# Patient Record
Sex: FEMALE | Race: ASIAN | Employment: FULL TIME | URBAN - METROPOLITAN AREA
[De-identification: names, ages, dates, MRNs, and addresses within clinical notes are randomized per-mention and may not be internally consistent; named-entity substitution may affect disease eponyms.]

---

## 2017-02-10 ENCOUNTER — OFFICE VISIT (OUTPATIENT)
Dept: INTERNAL MEDICINE CLINIC | Age: 47
End: 2017-02-10

## 2017-02-10 VITALS
SYSTOLIC BLOOD PRESSURE: 112 MMHG | HEART RATE: 79 BPM | WEIGHT: 161 LBS | HEIGHT: 60 IN | RESPIRATION RATE: 16 BRPM | DIASTOLIC BLOOD PRESSURE: 80 MMHG | BODY MASS INDEX: 31.61 KG/M2 | OXYGEN SATURATION: 97 % | TEMPERATURE: 98.2 F

## 2017-02-10 DIAGNOSIS — S16.1XXA NECK STRAIN, INITIAL ENCOUNTER: Primary | ICD-10-CM

## 2017-02-10 RX ORDER — CYCLOBENZAPRINE HCL 5 MG
5 TABLET ORAL
Qty: 30 TAB | Refills: 0 | Status: SHIPPED | OUTPATIENT
Start: 2017-02-10 | End: 2018-08-28 | Stop reason: SDUPTHER

## 2017-02-10 NOTE — PATIENT INSTRUCTIONS
Neck Spasm: Exercises  Your Care Instructions  Here are some examples of typical rehabilitation exercises for your condition. Start each exercise slowly. Ease off the exercise if you start to have pain. Your doctor or physical therapist will tell you when you can start these exercises and which ones will work best for you. How to do the exercises  Levator scapula stretch    1. Sit in a firm chair, or stand up straight. 2. Gently tilt your head toward your left shoulder. 3. Turn your head to look down into your armpit, bending your head slightly forward. Let the weight of your head stretch your neck muscles. 4. Hold for 15 to 30 seconds. 5. Return to your starting position. 6. Follow the same instructions above, but tilt your head toward your right shoulder. 7. Repeat 2 to 4 times toward each shoulder. Upper trapezius stretch    1. Sit in a firm chair, or stand up straight. 2. This stretch works best if you keep your shoulder down as you lean away from it. To help you remember to do this, start by relaxing your shoulders and lightly holding on to your thighs or your chair. 3. Tilt your head toward your shoulder and hold for 15 to 30 seconds. Let the weight of your head stretch your muscles. 4. If you would like a little added stretch, place your arm behind your back. Use the arm opposite of the direction you are tilting your head. For example, if you are tilting your head to the left, place your right arm behind your back. 5. Repeat 2 to 4 times toward each shoulder. Neck rotation    1. Sit in a firm chair, or stand up straight. 2. Keeping your chin level, turn your head to the right, and hold for 15 to 30 seconds. 3. Turn your head to the left, and hold for 15 to 30 seconds. 4. Repeat 2 to 4 times to each side. Chin tuck    1. Lie on the floor with a rolled-up towel under your neck. Your head should be touching the floor. 2. Slowly bring your chin toward the front of your neck.   3. Hold for a count of 6, and then relax for up to 10 seconds. 4. Repeat 8 to 12 times. Forward neck flexion    1. Sit in a firm chair, or stand up straight. 2. Bend your head forward. 3. Hold for 15 to 30 seconds, then return to your starting position. 4. Repeat 2 to 4 times. Follow-up care is a key part of your treatment and safety. Be sure to make and go to all appointments, and call your doctor if you are having problems. It's also a good idea to know your test results and keep a list of the medicines you take. Where can you learn more? Go to http://fredi-luis.info/. Enter P962 in the search box to learn more about \"Neck Spasm: Exercises. \"  Current as of: May 23, 2016  Content Version: 11.1  © 9455-8335 [employer]. Care instructions adapted under license by [employer] (which disclaims liability or warranty for this information). If you have questions about a medical condition or this instruction, always ask your healthcare professional. [place] [address] any warranty or liability for your use of this information. Neck: Exercises  Your Care Instructions  Here are some examples of typical rehabilitation exercises for your condition. Start each exercise slowly. Ease off the exercise if you start to have pain. Your doctor or physical therapist will tell you when you can start these exercises and which ones will work best for you. How to do the exercises  Note: Stretching should make you feel a gentle stretch, but no pain. Stop any strengthening exercise that makes pain worse. Neck stretch    8. This stretch works best if you keep your shoulder down as you lean away from it. To help you remember to do this, start by relaxing your shoulders and lightly holding on to your thighs or your chair. 9. Tilt your head toward your shoulder and hold for 15 to 30 seconds. Let the weight of your head stretch your muscles.   10. If you would like a little added stretch, use your hand to gently and steadily pull your head toward your shoulder. For example, keeping your right shoulder down, lean your head to the left. 11. Repeat 2 to 4 times toward each shoulder. Diagonal neck stretch    6. Turn your head slightly toward the direction you will be stretching, and tilt your head diagonally toward your chest and hold for 15 to 30 seconds. 7. If you would like a little added stretch, use your hand to gently and steadily pull your head forward on the diagonal.  8. Repeat 2 to 4 times toward each side. Dorsal glide stretch    5. Sit or stand tall and look straight ahead. 6. Slowly tuck your chin as you glide your head backward over your body  7. Hold for a count of 6, and then relax for up to 10 seconds. 8. Repeat 8 to 12 times. Note: The dorsal glide stretches the back of the neck. If you feel pain, do not glide so far back. Some people find this exercise easier to do while lying on their backs with an ice pack on the neck. Chest and shoulder stretch    5. Sit or stand tall and glide your head backward as in the dorsal glide stretch. 6. Raise both arms so that your hands are next to your ears. 7. Take a deep breath, and as you breathe out, lower your elbows down and behind your back. You will feel your shoulder blades slide down and together, and at the same time you will feel a stretch across your chest and the front of your shoulders. 8. Hold for about 6 seconds, and then relax for up to 10 seconds. 9. Repeat 8 to 12 times. Strengthening: Hands on head    5. Move your head backward, forward, and side to side against gentle pressure from your hands, holding each position for about 6 seconds. 6. Repeat 8 to 12 times. Follow-up care is a key part of your treatment and safety. Be sure to make and go to all appointments, and call your doctor if you are having problems. It's also a good idea to know your test results and keep a list of the medicines you take.   Where can you learn more? Go to http://fredi-luis.info/. Enter P975 in the search box to learn more about \"Neck: Exercises. \"  Current as of: May 23, 2016  Content Version: 11.1  © 0873-5579 [employer]. Care instructions adapted under license by [employer] (which disclaims liability or warranty for this information). If you have questions about a medical condition or this instruction, always ask your healthcare professional. [address] any warranty or liability for your use of this information.

## 2017-02-10 NOTE — PROGRESS NOTES
HPI:  Magdy Rueda is a 41y.o. year old female who returns to clinic today for an acute visit:   4 days ago onset of bilateral neck pain right more than left side and started after slept on sofa because her dog had surgery. She denies any injury, no radiation or numbness/tingling or weakness. Current Outpatient Prescriptions   Medication Sig Dispense Refill    glucose blood VI test strips (ONETOUCH ULTRA TEST) strip Check BS BID as directed. 100 Strip 1    coenzyme q10-vitamin e (COQ10 ) 100-100 mg-unit cap Take  by mouth.  multivitamin (ONE A DAY) tablet Take 1 Tab by mouth daily.  B.infantis-B.ani-B.long-B.bifi (PROBIOTIC 4X) 10-15 mg TbEC Take  by mouth.  Lancets misc Check BS as directed 100 Each 1    naproxen sodium (ALEVE) 220 mg cap Take 1 capsule by mouth as needed.  Cetirizine (ZYRTEC) 10 mg cap Take 1 capsule by mouth as needed. No Known Allergies  Social History   Substance Use Topics    Smoking status: Never Smoker    Smokeless tobacco: Never Used    Alcohol use No         Review of Systems   Constitutional: Negative for chills and fever. HENT: Negative for congestion and sore throat. Respiratory: Negative for shortness of breath. Cardiovascular: Negative for chest pain. Neurological: Negative for sensory change and focal weakness. Physical Exam   Constitutional: She appears well-developed. No distress. /80 (BP 1 Location: Right arm, BP Patient Position: Sitting)  Pulse 79  Temp 98.2 °F (36.8 °C)  Resp 16  Ht 5' (1.524 m)  Wt 161 lb (73 kg)  LMP 02/03/2017  SpO2 97%  BMI 31.44 kg/m2   Cardiovascular: Intact distal pulses. Abdominal: Soft. Bowel sounds are normal. There is no tenderness. Musculoskeletal: She exhibits no edema. Cervical back: She exhibits tenderness (paracervical musculature) and spasm. She exhibits normal range of motion and no bony tenderness.         Thoracic back: Normal.        Lumbar back: Normal.   Neurological: nonfocal   Vitals reviewed. Assessment & Plan:    ICD-10-CM ICD-9-CM    1. Neck strain, initial encounter S16. 1XXA 847.0    given exercises, ice, flexeril prn, advil prn. Orders Placed This Encounter    cyclobenzaprine (FLEXERIL) 5 mg tablet      Follow-up Disposition:  Return if symptoms worsen or fail to improve. Advised her to call back or return to office if symptoms worsen/change/persist.  Discussed expected course/resolution/complications of diagnosis in detail with patient. Medication risks/benefits/costs/interactions/alternatives discussed with patient. She was given an after visit summary which includes diagnoses, current medications, & vitals. She expressed understanding with the diagnosis and plan.

## 2017-02-10 NOTE — MR AVS SNAPSHOT
Visit Information Date & Time Provider Department Dept. Phone Encounter #  
 2/10/2017  3:30 PM Etienne Smith, [address] [facility] Internal Medicine [phone] [id number] Follow-up Instructions Return if symptoms worsen or fail to improve. Upcoming Health Maintenance Date Due  
 FOOT EXAM Q1 1/20/1982 MICROALBUMIN Q1 1/20/1982 Pneumococcal 19-64 Medium Risk (1 of 1 - PPSV23) 1/20/1991 HEMOGLOBIN A1C Q6M 6/3/2016 INFLUENZA AGE 9 TO ADULT 8/1/2016 LIPID PANEL Q1 12/4/2016 PAP AKA CERVICAL CYTOLOGY 6/1/2017 EYE EXAM RETINAL OR DILATED Q1 5/13/2017 DTaP/Tdap/Td series (2 - Td) 5/4/2022 Allergies as of 2/10/2017  Review Complete On: 2/10/2017 By: Etienne Smith MD  
 No Known Allergies Current Immunizations  Reviewed on 5/13/2016 Name Date Hepatitis B Vaccine 1/1/2001 Influenza Vaccine 10/9/2015, 10/28/2014, 10/15/2013 MMR Vaccine 1/1/2000 PPD 1/1/2000 TD Vaccine 1/1/2000 TDAP Vaccine 5/4/2012 Not reviewed this visit You Were Diagnosed With   
  
 Codes Comments Neck strain, initial encounter    -  Primary ICD-10-CM: S16. Carlos Orantes ICD-9-CM: 086. 0 Vitals BP Pulse Temp Resp Height(growth percentile) Weight(growth percentile) 112/80 (BP 1 Location: Right arm, BP Patient Position: Sitting) 79 98.2 °F (36.8 °C) 16 5' (1.524 m) 161 lb (73 kg) LMP SpO2 BMI OB Status Smoking Status 02/03/2017 97% 31.44 kg/m2 Having regular periods Never Smoker BMI and BSA Data Body Mass Index Body Surface Area  
 31.44 kg/m 2 1.76 m 2 Preferred Pharmacy Pharmacy Name Phone [id number], [address] [place] [address] [phone] Your Updated Medication List  
  
   
This list is accurate as of: 2/10/17  4:21 PM.  Always use your most recent med list.  
  
  
  
  
 ALEVE 220 mg Cap Generic drug:  naproxen sodium Take 1 capsule by mouth as needed. COQ10  100-100 mg-unit Cap Generic drug:  coenzyme q10-vitamin e Take  by mouth. cyclobenzaprine 5 mg tablet Commonly known as:  FLEXERIL Take 1 Tab by mouth three (3) times daily as needed for Muscle Spasm(s). glucose blood VI test strips strip Commonly known as:  ONETOUCH ULTRA TEST Check BS BID as directed. Lancets Misc Check BS as directed  
  
 multivitamin tablet Commonly known as:  ONE A DAY Take 1 Tab by mouth daily. PROBIOTIC 4X 10-15 mg Tbec Generic drug:  B.infantis-B.ani-B.long-B.bifi Take  by mouth. ZyrTEC 10 mg Cap Generic drug:  Cetirizine Take 1 capsule by mouth as needed. Prescriptions Sent to Pharmacy Refills  
 cyclobenzaprine (FLEXERIL) 5 mg tablet 0 Sig: Take 1 Tab by mouth three (3) times daily as needed for Muscle Spasm(s). Class: Normal  
 Pharmacy: [facility]/pharmacy #[id number] [place] [address] #: [phone] Route: Oral  
  
Follow-up Instructions Return if symptoms worsen or fail to improve. Patient Instructions Neck Spasm: Exercises Your Care Instructions Here are some examples of typical rehabilitation exercises for your condition. Start each exercise slowly. Ease off the exercise if you start to have pain. Your doctor or physical therapist will tell you when you can start these exercises and which ones will work best for you. How to do the exercises Levator scapula stretch 1. Sit in a firm chair, or stand up straight. 2. Gently tilt your head toward your left shoulder. 3. Turn your head to look down into your armpit, bending your head slightly forward. Let the weight of your head stretch your neck muscles. 4. Hold for 15 to 30 seconds. 5. Return to your starting position. 6. Follow the same instructions above, but tilt your head toward your right shoulder. 7. Repeat 2 to 4 times toward each shoulder. Upper trapezius stretch 1. Sit in a firm chair, or stand up straight. 2. This stretch works best if you keep your shoulder down as you lean away from it. To help you remember to do this, start by relaxing your shoulders and lightly holding on to your thighs or your chair. 3. Tilt your head toward your shoulder and hold for 15 to 30 seconds. Let the weight of your head stretch your muscles. 4. If you would like a little added stretch, place your arm behind your back. Use the arm opposite of the direction you are tilting your head. For example, if you are tilting your head to the left, place your right arm behind your back. 5. Repeat 2 to 4 times toward each shoulder. Neck rotation 1. Sit in a firm chair, or stand up straight. 2. Keeping your chin level, turn your head to the right, and hold for 15 to 30 seconds. 3. Turn your head to the left, and hold for 15 to 30 seconds. 4. Repeat 2 to 4 times to each side. Chin tuck 1. Lie on the floor with a rolled-up towel under your neck. Your head should be touching the floor. 2. Slowly bring your chin toward the front of your neck. 3. Hold for a count of 6, and then relax for up to 10 seconds. 4. Repeat 8 to 12 times. Forward neck flexion 1. Sit in a firm chair, or stand up straight. 2. Bend your head forward. 3. Hold for 15 to 30 seconds, then return to your starting position. 4. Repeat 2 to 4 times. Follow-up care is a key part of your treatment and safety. Be sure to make and go to all appointments, and call your doctor if you are having problems. It's also a good idea to know your test results and keep a list of the medicines you take. Where can you learn more? Go to http://fredi-ulis.info/. Enter P962 in the search box to learn more about \"Neck Spasm: Exercises. \" Current as of: May 23, 2016 Content Version: 11.1 © 5399-9709 [employer].  Care instructions adapted under license by [address] (which disclaims liability or warranty for this information). If you have questions about a medical condition or this instruction, always ask your healthcare professional. [address] any warranty or liability for your use of this information. Neck: Exercises Your Care Instructions Here are some examples of typical rehabilitation exercises for your condition. Start each exercise slowly. Ease off the exercise if you start to have pain. Your doctor or physical therapist will tell you when you can start these exercises and which ones will work best for you. How to do the exercises Note: Stretching should make you feel a gentle stretch, but no pain. Stop any strengthening exercise that makes pain worse. Neck stretch 8. This stretch works best if you keep your shoulder down as you lean away from it. To help you remember to do this, start by relaxing your shoulders and lightly holding on to your thighs or your chair. 9. Tilt your head toward your shoulder and hold for 15 to 30 seconds. Let the weight of your head stretch your muscles. 10. If you would like a little added stretch, use your hand to gently and steadily pull your head toward your shoulder. For example, keeping your right shoulder down, lean your head to the left. 11. Repeat 2 to 4 times toward each shoulder. Diagonal neck stretch 6. Turn your head slightly toward the direction you will be stretching, and tilt your head diagonally toward your chest and hold for 15 to 30 seconds. 7. If you would like a little added stretch, use your hand to gently and steadily pull your head forward on the diagonal. 
8. Repeat 2 to 4 times toward each side. Dorsal glide stretch 5. Sit or stand tall and look straight ahead. 6. Slowly tuck your chin as you glide your head backward over your body 7. Hold for a count of 6, and then relax for up to 10 seconds. 8. Repeat 8 to 12 times. Note: The dorsal glide stretches the back of the neck. If you feel pain, do not glide so far back. Some people find this exercise easier to do while lying on their backs with an ice pack on the neck. Chest and shoulder stretch 5. Sit or stand tall and glide your head backward as in the dorsal glide stretch. 6. Raise both arms so that your hands are next to your ears. 7. Take a deep breath, and as you breathe out, lower your elbows down and behind your back. You will feel your shoulder blades slide down and together, and at the same time you will feel a stretch across your chest and the front of your shoulders. 8. Hold for about 6 seconds, and then relax for up to 10 seconds. 9. Repeat 8 to 12 times. Strengthening: Hands on head 5. Move your head backward, forward, and side to side against gentle pressure from your hands, holding each position for about 6 seconds. 6. Repeat 8 to 12 times. Follow-up care is a key part of your treatment and safety. Be sure to make and go to all appointments, and call your doctor if you are having problems. It's also a good idea to know your test results and keep a list of the medicines you take. Where can you learn more? Go to http://fredi-luis.info/. Enter P975 in the search box to learn more about \"Neck: Exercises. \" Current as of: May 23, 2016 Content Version: 11.1 © 7676-6881 [employer]. Care instructions adapted under license by [employer] (which disclaims liability or warranty for this information). If you have questions about a medical condition or this instruction, always ask your healthcare professional. [place] [address] any warranty or liability for your use of this information. Introducing [facility]! Elise Zapata introduces [employer] patient portal. Now you can access parts of your medical record, email your doctor's office, and request medication refills online. 1. In your internet browser, go to [email or web address] 2. Click on the First Time User? Click Here link in the Sign In box. You will see the New Member Sign Up page. 3. Enter your [employer] Access Code exactly as it appears below. You will not need to use this code after youve completed the sign-up process. If you do not sign up before the expiration date, you must request a new code. · [employer] Access Code: [email or web address] Expires: 5/11/2017  4:21 PM 
 
4. Enter the last four digits of your Social Security Number (xxxx) and Date of Birth (mm/dd/yyyy) as indicated and click Submit. You will be taken to the next sign-up page. 5. Create a [employer] ID. This will be your [employer] login ID and cannot be changed, so think of one that is secure and easy to remember. 6. Create a [employer] password. You can change your password at any time. 7. Enter your Password Reset Question and Answer. This can be used at a later time if you forget your password. 8. Enter your e-mail address. You will receive e-mail notification when new information is available in [address] 9. Click Sign Up. You can now view and download portions of your medical record. 10. Click the Download Summary menu link to download a portable copy of your medical information. If you have questions, please visit the Frequently Asked Questions section of the [employer] website. Remember, [employer] is NOT to be used for urgent needs. For medical emergencies, dial 911. Now available from your iPhone and Android! Please provide this summary of care documentation to your next provider. Your primary care clinician is listed as Kiara Pierre. If you have any questions after today's visit, please call [phone].

## 2017-07-06 ENCOUNTER — TELEPHONE (OUTPATIENT)
Dept: INTERNAL MEDICINE CLINIC | Age: 47
End: 2017-07-06

## 2017-07-06 NOTE — TELEPHONE ENCOUNTER
Pt called and a message was left requesting a call back for an overdue appt. Per Md pt is overdue diabetes labs. Verify if Dr Osuna is still pt's PCP.

## 2018-08-28 ENCOUNTER — OFFICE VISIT (OUTPATIENT)
Dept: INTERNAL MEDICINE CLINIC | Age: 48
End: 2018-08-28

## 2018-08-28 VITALS
SYSTOLIC BLOOD PRESSURE: 140 MMHG | OXYGEN SATURATION: 97 % | HEART RATE: 64 BPM | WEIGHT: 168.6 LBS | RESPIRATION RATE: 16 BRPM | DIASTOLIC BLOOD PRESSURE: 90 MMHG | BODY MASS INDEX: 33.1 KG/M2 | TEMPERATURE: 98.2 F | HEIGHT: 60 IN

## 2018-08-28 DIAGNOSIS — E78.5 HYPERLIPIDEMIA, UNSPECIFIED HYPERLIPIDEMIA TYPE: ICD-10-CM

## 2018-08-28 DIAGNOSIS — J30.1 SEASONAL ALLERGIC RHINITIS DUE TO POLLEN: ICD-10-CM

## 2018-08-28 DIAGNOSIS — E11.9 CONTROLLED TYPE 2 DIABETES MELLITUS WITHOUT COMPLICATION, WITHOUT LONG-TERM CURRENT USE OF INSULIN (HCC): ICD-10-CM

## 2018-08-28 DIAGNOSIS — M62.838 CERVICAL PARASPINAL MUSCLE SPASM: Primary | ICD-10-CM

## 2018-08-28 RX ORDER — LANCETS
EACH MISCELLANEOUS
Qty: 100 EACH | Refills: 1 | Status: CANCELLED | OUTPATIENT
Start: 2018-08-28

## 2018-08-28 RX ORDER — CYCLOBENZAPRINE HCL 5 MG
5 TABLET ORAL
Qty: 30 TAB | Refills: 0 | Status: SHIPPED | OUTPATIENT
Start: 2018-08-28 | End: 2018-12-28

## 2018-08-28 NOTE — PROGRESS NOTES
HISTORY OF PRESENT ILLNESS Monty Quinones is a 57 y.o. female. HPI Patient complains of right lateral neck and shoulder pain, up her lateral neck to her posterior ear x 3-4 days. She denies inner ear pain. She also denies injury, but carries her heavy laptop on right side at times. She denies numbness, weakness, tingling, or radiating pain. Tylenol hs and a contoured memory foam pillow seems to help. Patient also has a history type II diabetes. She has not had labs and has not been checking blood sugar for at least 2 years. She has gained about 7 lbs since she was last seen but has started using her treadmill or walks/run outside 2 2-1/2 miles most days. Past Medical History:  
Diagnosis Date  Diabetes mellitus GDM- diet controlled  Diabetes mellitus type 2, controlled ([facility] [address] 12/9/2015 Current Outpatient Prescriptions on File Prior to Visit Medication Sig Dispense Refill  coenzyme q10-vitamin e (COQ10 ) 100-100 mg-unit cap Take  by mouth.  multivitamin (ONE A DAY) tablet Take 1 Tab by mouth daily.  B.infantis-B.ani-B.long-B.bifi (PROBIOTIC 4X) 10-15 mg TbEC Take  by mouth.  naproxen sodium (ALEVE) 220 mg cap Take 1 capsule by mouth as needed.  Cetirizine (ZYRTEC) 10 mg cap Take 1 capsule by mouth as needed.  cyclobenzaprine (FLEXERIL) 5 mg tablet Take 1 Tab by mouth three (3) times daily as needed for Muscle Spasm(s). 30 Tab 0  
 glucose blood VI test strips (ONETOUCH ULTRA TEST) strip Check BS BID as directed. 100 Strip 1  
 Lancets misc Check BS as directed 100 Each 1 No current facility-administered medications on file prior to visit. ROS Per HPI Physical Exam 
Visit Vitals  /90 (BP 1 Location: Right arm, BP Patient Position: Sitting)  Pulse 64  Temp 98.2 °F (36.8 °C) (Oral)  Resp 16  
 Ht 4' 11.75\" (1.518 m)  Wt 168 lb 9.6 oz (76.5 kg)  LMP 08/13/2018 (Approximate)  SpO2 97%  BMI 33.2 kg/m2 Patient appears well Sinuses: nontender Ears: tympanic membrane's and canals normal.  No erythema, fluid, drainage No temporomandibular joint tenderness Neck: no cervical spine tenderness, full range of motion, tender with spasm right trapezius muscle and right para cervical muscles. Heart[de-identified] normal rate, regular rhythm, normal S1, S2, no murmurs, rubs, clicks or gallops. Chest: clear to auscultation, no wheezes, rales or rhonchi, Extremities: no edema ASSESSMENT and PLAN Cervical muscle spasms Continue Tylenol as directed Moist heat to affected area 2-3 times daily Flexeril 5 mg tid prn Exercises provided for cervical muscle spasm/strain Call or return to clinic  if these symptoms worsen or fail to improve as anticipated. Type II DM: Patient is overdue for labs, Lipids, Hemoglobin A1C, Micro albumin, urinalysis, CMP, CBC, TSH ordered. Patient is not fasting, but has difficulty getting to appointments.  
Advised to schedule a physical exam with Nirmala Martinez MD

## 2018-08-28 NOTE — PATIENT INSTRUCTIONS
Continue Tylenol 2 tabs every 8 hours as needed Flexeril 5 mg three times daily as needed Moist heat to affected area 2-3 times daily Neck Strain or Sprain: Rehab Exercises Your Care Instructions Here are some examples of typical rehabilitation exercises for your condition. Start each exercise slowly. Ease off the exercise if you start to have pain. Your doctor or physical therapist will tell you when you can start these exercises and which ones will work best for you. How to do the exercises Neck rotation 1. Sit in a firm chair, or stand up straight. 2. Keeping your chin level, turn your head to the right, and hold for 15 to 30 seconds. 3. Turn your head to the left and hold for 15 to 30 seconds. 4. Repeat 2 to 4 times to each side. Neck stretches 1. Look straight ahead, and tip your right ear to your right shoulder. Do not let your left shoulder rise up as you tip your head to the right. 2. Hold for 15 to 30 seconds. 3. Tilt your head to the left. Do not let your right shoulder rise up as you tip your head to the left. 4. Hold for 15 to 30 seconds. 5. Repeat 2 to 4 times to each side. Forward neck flexion 1. Sit in a firm chair, or stand up straight. 2. Bend your head forward. 3. Hold for 15 to 30 seconds. 4. Repeat 2 to 4 times. Lateral (side) bend strengthening 1. With your right hand, place your first two fingers on your right temple. 2. Start to bend your head to the side while using gentle pressure from your fingers to keep your head from bending. 3. Hold for about 6 seconds. 4. Repeat 8 to 12 times. 5. Switch hands and repeat the same exercise on your left side. Forward bend strengthening 1. Place your first two fingers of either hand on your forehead. 2. Start to bend your head forward while using gentle pressure from your fingers to keep your head from bending. 3. Hold for about 6 seconds. 4. Repeat 8 to 12 times. Neutral position strengthening 1. Using one hand, place your fingertips on the back of your head at the top of your neck. 2. Start to bend your head backward while using gentle pressure from your fingers to keep your head from bending. 3. Hold for about 6 seconds. 4. Repeat 8 to 12 times. Chin tuck 1. Lie on the floor with a rolled-up towel under your neck. Your head should be touching the floor. 2. Slowly bring your chin toward your chest. 
3. Hold for a count of 6, and then relax for up to 10 seconds. 4. Repeat 8 to 12 times. Follow-up care is a key part of your treatment and safety. Be sure to make and go to all appointments, and call your doctor if you are having problems. It's also a good idea to know your test results and keep a list of the medicines you take. Where can you learn more? Go to http://fredi-luis.info/. Enter M679 in the search box to learn more about \"Neck Strain or Sprain: Rehab Exercises. \" Current as of: November 29, 2017 Content Version: 11.7 © 2431-2748 [employer]. Care instructions adapted under license by [employer] (which disclaims liability or warranty for this information). If you have questions about a medical condition or this instruction, always ask your healthcare professional. [place] [address] any warranty or liability for your use of this information. Call or return to clinic  if these symptoms worsen or fail to improve as anticipated.   
 
Schedule a physical exam with Domenic Mcardle, MD

## 2018-08-28 NOTE — PROGRESS NOTES
Pt. Is here for right ear down right neck pain x3d. Pt. Has not checked her glucose in over 1 year. Pt. Will schedule a physical with pap with Dr Rosario Ramirez.

## 2018-08-28 NOTE — MR AVS SNAPSHOT
[address] [place] [address] 
[phone] Patient: Jimy Valverde MRN: [id number] RAL:[id number] Visit Information Date & Time Provider Department Dept. Phone Encounter #  
 8/28/2018 10:15 AM Mark Duron, [address] [facility] Internal Medicine [phone] [id number] Upcoming Health Maintenance Date Due  
 FOOT EXAM Q1 1/20/1982 MICROALBUMIN Q1 1/20/1982 Pneumococcal 19-64 Medium Risk (1 of 1 - PPSV23) 1/20/1991 HEMOGLOBIN A1C Q6M 6/3/2016 LIPID PANEL Q1 12/4/2016 EYE EXAM RETINAL OR DILATED Q1 5/13/2017 PAP AKA CERVICAL CYTOLOGY 6/1/2017 Influenza Age 5 to Adult 8/1/2018 DTaP/Tdap/Td series (2 - Td) 5/4/2022 Allergies as of 8/28/2018  Review Complete On: 8/28/2018 By: Yesica Whitten LPN No Known Allergies Current Immunizations  Reviewed on 5/13/2016 Name Date Hepatitis B Vaccine 1/1/2001 Influenza Vaccine 10/9/2015, 10/28/2014, 10/15/2013 MMR Vaccine 1/1/2000 PPD 1/1/2000 TD Vaccine 1/1/2000 TDAP Vaccine 5/4/2012 Not reviewed this visit You Were Diagnosed With   
  
 Codes Comments Controlled type 2 diabetes mellitus without complication, without long-term current use of insulin ([facility] [address]    -  Primary ICD-10-CM: E11.9 ICD-9-CM: 250.00 Seasonal allergic rhinitis due to pollen     ICD-10-CM: J30.1 ICD-9-CM: 477.0 Hyperlipidemia, unspecified hyperlipidemia type     ICD-10-CM: E78.5 ICD-9-CM: 272.4 Vitals BP Pulse Temp Resp Height(growth percentile) Weight(growth percentile) 140/90 (BP 1 Location: Right arm, BP Patient Position: Sitting) 64 98.2 °F (36.8 °C) (Oral) 16 4' 11.75\" (1.518 m) 168 lb 9.6 oz (76.5 kg) LMP SpO2 BMI OB Status Smoking Status 08/13/2018 (Approximate) 97% 33.2 kg/m2 Having regular periods Never Smoker BMI and BSA Data Body Mass Index Body Surface Area  
 33.2 kg/m 2 1.8 m 2 Preferred Pharmacy Pharmacy Name Phone [facility], [address]  [place] [phone] Your Updated Medication List  
  
   
This list is accurate as of 8/28/18 11:17 AM.  Always use your most recent med list.  
  
  
  
  
 ALEVE 220 mg Cap Generic drug:  naproxen sodium Take 1 capsule by mouth as needed. COQ10  100-100 mg-unit Cap Generic drug:  coenzyme q10-vitamin e Take  by mouth. cyclobenzaprine 5 mg tablet Commonly known as:  FLEXERIL Take 1 Tab by mouth three (3) times daily as needed for Muscle Spasm(s). glucose blood VI test strips strip Commonly known as:  ONETOUCH ULTRA TEST Check BS BID as directed. Lancets Misc Check BS as directed  
  
 multivitamin tablet Commonly known as:  ONE A DAY Take 1 Tab by mouth daily. PROBIOTIC 4X 10-15 mg Tbec Generic drug:  B.infantis-B.ani-B.long-B.bifi Take  by mouth. ZyrTEC 10 mg Cap Generic drug:  Cetirizine Take 1 capsule by mouth as needed. Prescriptions Sent to Pharmacy Refills  
 cyclobenzaprine (FLEXERIL) 5 mg tablet 0 Sig: Take 1 Tab by mouth three (3) times daily as needed for Muscle Spasm(s). Class: Normal  
 Pharmacy: [facility] [address] [place] [address], [address] AT [address] [place] [address] #: [phone] Route: Oral  
  
We Performed the Following CBC WITH AUTOMATED DIFF [91886 CPT(R)] HEMOGLOBIN A1C WITH EAG [80242 CPT(R)] LIPID PANEL [94522 CPT(R)] METABOLIC PANEL, COMPREHENSIVE [62388 CPT(R)] MICROALBUMIN, UR, RAND W/ MICROALB/CREAT RATIO [id number] CPT(R)] TSH 3RD GENERATION [59743 CPT(R)] UA/M W/RFLX CULTURE, ROUTINE [[id number] Custom] Patient Instructions Continue Tylenol 2 tabs every 8 hours as needed Flexeril 5 mg three times daily as needed Moist heat to affected area 2-3 times daily Neck Strain or Sprain: Rehab Exercises Your Care Instructions Here are some examples of typical rehabilitation exercises for your condition. Start each exercise slowly. Ease off the exercise if you start to have pain. Your doctor or physical therapist will tell you when you can start these exercises and which ones will work best for you. How to do the exercises Neck rotation 1. Sit in a firm chair, or stand up straight. 2. Keeping your chin level, turn your head to the right, and hold for 15 to 30 seconds. 3. Turn your head to the left and hold for 15 to 30 seconds. 4. Repeat 2 to 4 times to each side. Neck stretches 1. Look straight ahead, and tip your right ear to your right shoulder. Do not let your left shoulder rise up as you tip your head to the right. 2. Hold for 15 to 30 seconds. 3. Tilt your head to the left. Do not let your right shoulder rise up as you tip your head to the left. 4. Hold for 15 to 30 seconds. 5. Repeat 2 to 4 times to each side. Forward neck flexion 1. Sit in a firm chair, or stand up straight. 2. Bend your head forward. 3. Hold for 15 to 30 seconds. 4. Repeat 2 to 4 times. Lateral (side) bend strengthening 1. With your right hand, place your first two fingers on your right temple. 2. Start to bend your head to the side while using gentle pressure from your fingers to keep your head from bending. 3. Hold for about 6 seconds. 4. Repeat 8 to 12 times. 5. Switch hands and repeat the same exercise on your left side. Forward bend strengthening 1. Place your first two fingers of either hand on your forehead. 2. Start to bend your head forward while using gentle pressure from your fingers to keep your head from bending. 3. Hold for about 6 seconds. 4. Repeat 8 to 12 times. Neutral position strengthening 1. Using one hand, place your fingertips on the back of your head at the top of your neck. 2. Start to bend your head backward while using gentle pressure from your fingers to keep your head from bending. 3. Hold for about 6 seconds. 4. Repeat 8 to 12 times. Chin tuck 1. Lie on the floor with a rolled-up towel under your neck. Your head should be touching the floor. 2. Slowly bring your chin toward your chest. 
3. Hold for a count of 6, and then relax for up to 10 seconds. 4. Repeat 8 to 12 times. Follow-up care is a key part of your treatment and safety. Be sure to make and go to all appointments, and call your doctor if you are having problems. It's also a good idea to know your test results and keep a list of the medicines you take. Where can you learn more? Go to http://fredi-luis.info/. Enter M679 in the search box to learn more about \"Neck Strain or Sprain: Rehab Exercises. \" Current as of: November 29, 2017 Content Version: 11.7 © 2835-9534 [employer]. Care instructions adapted under license by [employer] (which disclaims liability or warranty for this information). If you have questions about a medical condition or this instruction, always ask your healthcare professional. [place] [address] any warranty or liability for your use of this information. Call or return to clinic  if these symptoms worsen or fail to improve as anticipated. Schedule a physical exam with Segun Gold MD  
 
 
  
Introducing [facility]! Nickolas Amin introduces LearnBIG patient portal. Now you can access parts of your medical record, email your doctor's office, and request medication refills online. 1. In your internet browser, go to [email or web address] 2. Click on the First Time User? Click Here link in the Sign In box. You will see the New Member Sign Up page. 3. Enter your LearnBIG Access Code exactly as it appears below. You will not need to use this code after youve completed the sign-up process.  If you do not sign up before the expiration date, you must request a new code. · kalidea Access Code: [email or web address] Expires: 11/26/2018 10:37 AM 
 
4. Enter the last four digits of your Social Security Number (xxxx) and Date of Birth (mm/dd/yyyy) as indicated and click Submit. You will be taken to the next sign-up page. 5. Create a kalidea ID. This will be your kalidea login ID and cannot be changed, so think of one that is secure and easy to remember. 6. Create a kalidea password. You can change your password at any time. 7. Enter your Password Reset Question and Answer. This can be used at a later time if you forget your password. 8. Enter your e-mail address. You will receive e-mail notification when new information is available in [address] 9. Click Sign Up. You can now view and download portions of your medical record. 10. Click the Download Summary menu link to download a portable copy of your medical information. If you have questions, please visit the Frequently Asked Questions section of the kalidea website. Remember, kalidea is NOT to be used for urgent needs. For medical emergencies, dial 911. Now available from your iPhone and Android! Please provide this summary of care documentation to your next provider. Your primary care clinician is listed as Marian Santiago. If you have any questions after today's visit, please call [phone].

## 2018-11-29 ENCOUNTER — OFFICE VISIT (OUTPATIENT)
Dept: INTERNAL MEDICINE CLINIC | Age: 48
End: 2018-11-29

## 2018-11-29 VITALS
SYSTOLIC BLOOD PRESSURE: 128 MMHG | WEIGHT: 172.4 LBS | DIASTOLIC BLOOD PRESSURE: 88 MMHG | OXYGEN SATURATION: 96 % | HEIGHT: 60 IN | RESPIRATION RATE: 16 BRPM | HEART RATE: 89 BPM | TEMPERATURE: 98 F | BODY MASS INDEX: 33.85 KG/M2

## 2018-11-29 DIAGNOSIS — J03.90 TONSILLITIS: Primary | ICD-10-CM

## 2018-11-29 RX ORDER — AMOXICILLIN AND CLAVULANATE POTASSIUM 875; 125 MG/1; MG/1
1 TABLET, FILM COATED ORAL EVERY 12 HOURS
Qty: 20 TAB | Refills: 0 | Status: SHIPPED | OUTPATIENT
Start: 2018-11-29 | End: 2018-12-09

## 2018-11-29 NOTE — PROGRESS NOTES
HPI:  Duane Graces is a 57y.o. year old female who returns to clinic today for follow up:   Jacklyn Bernardo has something in the back of the throat  on right side of her throat and started about 2 weeks ago. No nasal congestion, sinus tenderness. Current Outpatient Medications   Medication Sig Dispense Refill    coenzyme q10-vitamin e (COQ10 ) 100-100 mg-unit cap Take  by mouth.  multivitamin (ONE A DAY) tablet Take 1 Tab by mouth daily.  B.infantis-B.ani-B.long-B.bifi (PROBIOTIC 4X) 10-15 mg TbEC Take  by mouth.  naproxen sodium (ALEVE) 220 mg cap Take 1 capsule by mouth as needed.  Cetirizine (ZYRTEC) 10 mg cap Take 1 capsule by mouth as needed.  cyclobenzaprine (FLEXERIL) 5 mg tablet Take 1 Tab by mouth three (3) times daily as needed for Muscle Spasm(s). 30 Tab 0    glucose blood VI test strips (ONETOUCH ULTRA TEST) strip Check BS BID as directed. 100 Strip 1    Lancets misc Check BS as directed 100 Each 1     No Known Allergies  Social History     Tobacco Use    Smoking status: Never Smoker    Smokeless tobacco: Never Used   Substance Use Topics    Alcohol use: No     Alcohol/week: 0.0 oz         Review of Systems   Respiratory: Negative for cough and shortness of breath. Cardiovascular: Negative for chest pain. Physical Exam   Constitutional: She appears well-developed and well-nourished. No distress. HENT:   Head: Normocephalic and atraumatic. Right Ear: Tympanic membrane and ear canal normal.   Left Ear: Tympanic membrane and ear canal normal.   Nose: Nose normal.   Post pharynx erythema and right tonsil enlarged and erythema. No exudate. Cardiovascular: Normal rate. Pulmonary/Chest: Effort normal and breath sounds normal.   Lymphadenopathy:        Head (right side): Tonsillar adenopathy present.      Visit Vitals  /88 (BP 1 Location: Left arm, BP Patient Position: Sitting)   Pulse 89   Temp 98 °F (36.7 °C) (Oral)   Resp 16   Ht 4' 11.75\" (1.518 m)   Wt 172 lb 6.4 oz (78.2 kg)   LMP 11/12/2018 (Approximate)   SpO2 96%   BMI 33.95 kg/m²       Assessment & Plan:    ICD-10-CM ICD-9-CM    1. Tonsillitis J03.90 463 REFERRAL TO ENT-OTOLARYNGOLOGY   augmentin bid x 10 days and if symptoms do not resolve she will see ENT. Probiotic while on abx. Orders Placed This Encounter    REFERRAL TO ENT-OTOLARYNGOLOGY    amoxicillin-clavulanate (AUGMENTIN) 875-125 mg per tablet      Follow-up Disposition:  Return if symptoms worsen or fail to improve. Advised her to call back or return to office if symptoms worsen/change/persist.  Discussed expected course/resolution/complications of diagnosis in detail with patient. Medication risks/benefits/costs/interactions/alternatives discussed with patient. She was given an after visit summary which includes diagnoses, current medications, & vitals. She expressed understanding with the diagnosis and plan.

## 2018-11-29 NOTE — PROGRESS NOTES
Chief Complaint   Patient presents with    Other     Patient states she is here for \"feeling like there's food stuck on right side of throat. \"  Patient states symptom for about a couple of weeks. Patient denies any pain.

## 2018-12-28 ENCOUNTER — APPOINTMENT (OUTPATIENT)
Dept: CT IMAGING | Age: 48
End: 2018-12-28
Attending: EMERGENCY MEDICINE
Payer: COMMERCIAL

## 2018-12-28 ENCOUNTER — HOSPITAL ENCOUNTER (EMERGENCY)
Age: 48
Discharge: HOME | End: 2018-12-28
Attending: EMERGENCY MEDICINE
Payer: COMMERCIAL

## 2018-12-28 VITALS
DIASTOLIC BLOOD PRESSURE: 80 MMHG | BODY MASS INDEX: 32.17 KG/M2 | WEIGHT: 170.42 LBS | RESPIRATION RATE: 14 BRPM | HEIGHT: 61 IN | HEART RATE: 84 BPM | SYSTOLIC BLOOD PRESSURE: 147 MMHG | OXYGEN SATURATION: 97 % | TEMPERATURE: 98 F

## 2018-12-28 DIAGNOSIS — N20.0 KIDNEY STONE ON LEFT SIDE: ICD-10-CM

## 2018-12-28 DIAGNOSIS — N30.01 ACUTE CYSTITIS WITH HEMATURIA: Primary | ICD-10-CM

## 2018-12-28 LAB
ALBUMIN SERPL-MCNC: 3.6 G/DL (ref 3.5–5)
ALBUMIN/GLOB SERPL: 1 {RATIO} (ref 1.1–2.2)
ALP SERPL-CCNC: 52 U/L (ref 45–117)
ALT SERPL-CCNC: 47 U/L (ref 12–78)
ANION GAP SERPL CALC-SCNC: 12 MMOL/L (ref 5–15)
APPEARANCE UR: ABNORMAL
AST SERPL-CCNC: 17 U/L (ref 15–37)
BACTERIA URNS QL MICRO: ABNORMAL /HPF
BASOPHILS # BLD: 0 K/UL (ref 0–0.1)
BASOPHILS NFR BLD: 0 % (ref 0–1)
BILIRUB SERPL-MCNC: 0.5 MG/DL (ref 0.2–1)
BILIRUB UR QL: NEGATIVE
BUN SERPL-MCNC: 10 MG/DL (ref 6–20)
BUN/CREAT SERPL: 13 (ref 12–20)
CALCIUM SERPL-MCNC: 8.7 MG/DL (ref 8.5–10.1)
CHLORIDE SERPL-SCNC: 97 MMOL/L (ref 97–108)
CO2 SERPL-SCNC: 23 MMOL/L (ref 21–32)
COLOR UR: ABNORMAL
CREAT SERPL-MCNC: 0.8 MG/DL (ref 0.55–1.02)
DIFFERENTIAL METHOD BLD: NORMAL
EOSINOPHIL # BLD: 0.4 K/UL (ref 0–0.4)
EOSINOPHIL NFR BLD: 4 % (ref 0–7)
EPITH CASTS URNS QL MICRO: ABNORMAL /LPF
ERYTHROCYTE [DISTWIDTH] IN BLOOD BY AUTOMATED COUNT: 12.7 % (ref 11.5–14.5)
GLOBULIN SER CALC-MCNC: 3.7 G/DL (ref 2–4)
GLUCOSE SERPL-MCNC: 268 MG/DL (ref 65–100)
GLUCOSE UR STRIP.AUTO-MCNC: NEGATIVE MG/DL
HCG UR QL: NEGATIVE
HCT VFR BLD AUTO: 40.6 % (ref 35–47)
HGB BLD-MCNC: 13.8 G/DL (ref 11.5–16)
HGB UR QL STRIP: ABNORMAL
HYALINE CASTS URNS QL MICRO: ABNORMAL /LPF (ref 0–5)
IMM GRANULOCYTES # BLD: 0 K/UL (ref 0–0.04)
IMM GRANULOCYTES NFR BLD AUTO: 0 % (ref 0–0.5)
KETONES UR QL STRIP.AUTO: NEGATIVE MG/DL
LEUKOCYTE ESTERASE UR QL STRIP.AUTO: ABNORMAL
LIPASE SERPL-CCNC: 134 U/L (ref 73–393)
LYMPHOCYTES # BLD: 1.8 K/UL (ref 0.8–3.5)
LYMPHOCYTES NFR BLD: 18 % (ref 12–49)
MCH RBC QN AUTO: 28.1 PG (ref 26–34)
MCHC RBC AUTO-ENTMCNC: 34 G/DL (ref 30–36.5)
MCV RBC AUTO: 82.7 FL (ref 80–99)
MONOCYTES # BLD: 0.6 K/UL (ref 0–1)
MONOCYTES NFR BLD: 6 % (ref 5–13)
NEUTS SEG # BLD: 7.2 K/UL (ref 1.8–8)
NEUTS SEG NFR BLD: 72 % (ref 32–75)
NITRITE UR QL STRIP.AUTO: NEGATIVE
NRBC # BLD: 0 K/UL (ref 0–0.01)
NRBC BLD-RTO: 0 PER 100 WBC
PH UR STRIP: 6 [PH] (ref 5–8)
PLATELET # BLD AUTO: 283 K/UL (ref 150–400)
PMV BLD AUTO: 9.4 FL (ref 8.9–12.9)
POTASSIUM SERPL-SCNC: 3.9 MMOL/L (ref 3.5–5.1)
PROT SERPL-MCNC: 7.3 G/DL (ref 6.4–8.2)
PROT UR STRIP-MCNC: 300 MG/DL
RBC # BLD AUTO: 4.91 M/UL (ref 3.8–5.2)
RBC #/AREA URNS HPF: >100 /HPF (ref 0–5)
SODIUM SERPL-SCNC: 132 MMOL/L (ref 136–145)
SP GR UR REFRACTOMETRY: 1.02 (ref 1–1.03)
UA: UC IF INDICATED,UAUC: ABNORMAL
UROBILINOGEN UR QL STRIP.AUTO: 0.2 EU/DL (ref 0.2–1)
WBC # BLD AUTO: 10.1 K/UL (ref 3.6–11)
WBC URNS QL MICRO: ABNORMAL /HPF (ref 0–4)

## 2018-12-28 PROCEDURE — 81025 URINE PREGNANCY TEST: CPT

## 2018-12-28 PROCEDURE — 87077 CULTURE AEROBIC IDENTIFY: CPT

## 2018-12-28 PROCEDURE — 81001 URINALYSIS AUTO W/SCOPE: CPT

## 2018-12-28 PROCEDURE — 87086 URINE CULTURE/COLONY COUNT: CPT

## 2018-12-28 PROCEDURE — 36415 COLL VENOUS BLD VENIPUNCTURE: CPT

## 2018-12-28 PROCEDURE — 80053 COMPREHEN METABOLIC PANEL: CPT

## 2018-12-28 PROCEDURE — 74176 CT ABD & PELVIS W/O CONTRAST: CPT

## 2018-12-28 PROCEDURE — 87186 SC STD MICRODIL/AGAR DIL: CPT

## 2018-12-28 PROCEDURE — 85025 COMPLETE CBC W/AUTO DIFF WBC: CPT

## 2018-12-28 PROCEDURE — 83690 ASSAY OF LIPASE: CPT

## 2018-12-28 PROCEDURE — 99283 EMERGENCY DEPT VISIT LOW MDM: CPT

## 2018-12-28 RX ORDER — PHENAZOPYRIDINE HYDROCHLORIDE 200 MG/1
200 TABLET, FILM COATED ORAL 3 TIMES DAILY
Qty: 6 TAB | Refills: 0 | Status: SHIPPED | OUTPATIENT
Start: 2018-12-28 | End: 2018-12-30

## 2018-12-28 RX ORDER — CEPHALEXIN 500 MG/1
500 CAPSULE ORAL 3 TIMES DAILY
Qty: 21 CAP | Refills: 0 | Status: SHIPPED | OUTPATIENT
Start: 2018-12-28 | End: 2019-01-04

## 2018-12-28 NOTE — DISCHARGE INSTRUCTIONS
Kidney Stone: Care Instructions  Your Care Instructions    Kidney stones are formed when salts, minerals, and other substances normally found in the urine clump together. They can be as small as grains of sand or, rarely, as large as golf balls. While the stone is traveling through the ureter, which is the tube that carries urine from the kidney to the bladder, you will probably feel pain. The pain may be mild or very severe. You may also have some blood in your urine. As soon as the stone reaches the bladder, any intense pain should go away. If a stone is too large to pass on its own, you may need a medical procedure to help you pass the stone. The doctor has checked you carefully, but problems can develop later. If you notice any problems or new symptoms, get medical treatment right away. Follow-up care is a key part of your treatment and safety. Be sure to make and go to all appointments, and call your doctor if you are having problems. It's also a good idea to know your test results and keep a list of the medicines you take. How can you care for yourself at home? · Drink plenty of fluids, enough so that your urine is light yellow or clear like water. If you have kidney, heart, or liver disease and have to limit fluids, talk with your doctor before you increase the amount of fluids you drink. · Take pain medicines exactly as directed. Call your doctor if you think you are having a problem with your medicine. ? If the doctor gave you a prescription medicine for pain, take it as prescribed. ? If you are not taking a prescription pain medicine, ask your doctor if you can take an over-the-counter medicine. Read and follow all instructions on the label. · Your doctor may ask you to strain your urine so that you can collect your kidney stone when it passes. You can use a kitchen strainer or a tea strainer to catch the stone. Store it in a plastic bag until you see your doctor again.   Preventing future kidney stones  Some changes in your diet may help prevent kidney stones. Depending on the cause of your stones, your doctor may recommend that you:  · Drink plenty of fluids, enough so that your urine is light yellow or clear like water. If you have kidney, heart, or liver disease and have to limit fluids, talk with your doctor before you increase the amount of fluids you drink. · Limit coffee, tea, and alcohol. Also avoid grapefruit juice. · Do not take more than the recommended daily dose of vitamins C and D.  · Avoid antacids such as Gaviscon, Maalox, Mylanta, or Tums. · Limit the amount of salt (sodium) in your diet. · Eat a balanced diet that is not too high in protein. · Limit foods that are high in a substance called oxalate, which can cause kidney stones. These foods include dark green vegetables, rhubarb, chocolate, wheat bran, nuts, cranberries, and beans. When should you call for help? Call your doctor now or seek immediate medical care if:    · You cannot keep down fluids.     · Your pain gets worse.     · You have a fever or chills.     · You have new or worse pain in your back just below your rib cage (the flank area).     · You have new or more blood in your urine.    Watch closely for changes in your health, and be sure to contact your doctor if:    · You do not get better as expected. Where can you learn more? Go to http://fredi-luis.info/. Enter X348 in the search box to learn more about \"Kidney Stone: Care Instructions. \"  Current as of: March 15, 2018  Content Version: 11.8  © 1571-2756 [employer]. Care instructions adapted under license by [employer] (which disclaims liability or warranty for this information). If you have questions about a medical condition or this instruction, always ask your healthcare professional. [address] any warranty or liability for your use of this information.            Urinary Tract Infection in Women: Care Instructions  Your Care Instructions    A urinary tract infection, or UTI, is a general term for an infection anywhere between the kidneys and the urethra (where urine comes out). Most UTIs are bladder infections. They often cause pain or burning when you urinate. UTIs are caused by bacteria and can be cured with antibiotics. Be sure to complete your treatment so that the infection goes away. Follow-up care is a key part of your treatment and safety. Be sure to make and go to all appointments, and call your doctor if you are having problems. It's also a good idea to know your test results and keep a list of the medicines you take. How can you care for yourself at home? · Take your antibiotics as directed. Do not stop taking them just because you feel better. You need to take the full course of antibiotics. · Drink extra water and other fluids for the next day or two. This may help wash out the bacteria that are causing the infection. (If you have kidney, heart, or liver disease and have to limit fluids, talk with your doctor before you increase your fluid intake.)  · Avoid drinks that are carbonated or have caffeine. They can irritate the bladder. · Urinate often. Try to empty your bladder each time. · To relieve pain, take a hot bath or lay a heating pad set on low over your lower belly or genital area. Never go to sleep with a heating pad in place. To prevent UTIs  · Drink plenty of water each day. This helps you urinate often, which clears bacteria from your system. (If you have kidney, heart, or liver disease and have to limit fluids, talk with your doctor before you increase your fluid intake.)  · Urinate when you need to. · Urinate right after you have sex. · Change sanitary pads often. · Avoid douches, bubble baths, feminine hygiene sprays, and other feminine hygiene products that have deodorants. · After going to the bathroom, wipe from front to back.   When should you call for help? Call your doctor now or seek immediate medical care if:    · Symptoms such as fever, chills, nausea, or vomiting get worse or appear for the first time.     · You have new pain in your back just below your rib cage. This is called flank pain.     · There is new blood or pus in your urine.     · You have any problems with your antibiotic medicine.    Watch closely for changes in your health, and be sure to contact your doctor if:    · You are not getting better after taking an antibiotic for 2 days.     · Your symptoms go away but then come back. Where can you learn more? Go to http://fredi-luis.info/. Enter V035 in the search box to learn more about \"Urinary Tract Infection in Women: Care Instructions. \"  Current as of: March 21, 2018  Content Version: 11.8  © 4367-9951 Healthwise, Incorporated. Care instructions adapted under license by [employer] (which disclaims liability or warranty for this information). If you have questions about a medical condition or this instruction, always ask your healthcare professional. [place] [address] any warranty or liability for your use of this information.

## 2018-12-28 NOTE — ED NOTES
Pt reports blood in urine beginning last night with burning upon urination, pt provided urine sample that was bloody and cloudy

## 2018-12-28 NOTE — ED PROVIDER NOTES
EMERGENCY DEPARTMENT HISTORY AND PHYSICAL EXAM 
 
 
Date: 2018 Patient Name: Zakia Gomes History of Presenting Illness Chief Complaint Patient presents with  Blood in Urine Lower abdominal/pelvic cramping since last night. States it is not her cycle because she came on her cycle last week and went off earlier in the week. No previous hx. History Provided By: Patient HPI: Zakia Gomes, 57 y.o. female with PMHx significant for DM (type 2), presents ambulatory to the ED with cc of hematuria that started yesterday. Pt is also reporting some constant and mild lower abd cramping pain that started this morning that is a 2-3 / 10 in severity. She is also reporting dysuria. This patient reports last night during onset of the hematuria she was planning on seeing her PCP today for her Sx, however her Sx worsened this morning which prompted her visit to the ER. She states she has taken no pain meds to try and relieve her pain. She denies any modifying factors to her Sx. Pt states she is not on her cycle because she came on her cycle last week and went off earlier in the week. She states her BMs have been normal. She states she has a Hx of . Pt states she is not a smoker, and does not drink EtOH. Pt notes a FHx of cancer and HTN. Pt specifically denies fever, chills, N/V/D, CP, SOB, back pain, melena, hematochezia, hematemesis, and dizziness. There are no other complaints, changes, or physical findings at this time. Surgical History:  () Social History: -tobacco, -EtOH, -Illicit Drugs Family History: Cancer and HTN 
 
PCP: Zora Allen MD 
 
Current Outpatient Medications Medication Sig Dispense Refill  cephALEXin (KEFLEX) 500 mg capsule Take 1 Cap by mouth three (3) times daily for 7 days. 21 Cap 0  phenazopyridine (PYRIDIUM) 200 mg tablet Take 1 Tab by mouth three (3) times daily for 6 doses.  6 Tab 0  
  glucose blood VI test strips (ONETOUCH ULTRA TEST) strip Check BS BID as directed. 100 Strip 1  coenzyme q10-vitamin e (COQ10 ) 100-100 mg-unit cap Take  by mouth.  multivitamin (ONE A DAY) tablet Take 1 Tab by mouth daily.  B.infantis-B.ani-B.long-B.bifi (PROBIOTIC 4X) 10-15 mg TbEC Take  by mouth.  Lancets misc Check BS as directed 100 Each 1  
 naproxen sodium (ALEVE) 220 mg cap Take 1 capsule by mouth as needed.  Cetirizine (ZYRTEC) 10 mg cap Take 1 capsule by mouth as needed. Past History Past Medical History: 
Past Medical History:  
Diagnosis Date  Diabetes mellitus GDM- diet controlled  Diabetes mellitus type 2, controlled ([address] 2015 Past Surgical History: 
Past Surgical History:  
Procedure Laterality Date Joanne Simmons GYN    
   Family History: 
Family History Problem Relation Age of Onset  Cancer Mother   
     lung  Hypertension Father  Cancer Maternal Grandfather   
     lung Social History: 
Social History Tobacco Use  Smoking status: Never Smoker  Smokeless tobacco: Never Used Substance Use Topics  Alcohol use: No  
  Alcohol/week: 0.0 oz  Drug use: No  
 
 
Allergies: 
No Known Allergies Review of Systems Review of Systems Constitutional: Negative for chills and fever. HENT: Negative for congestion. Eyes: Negative. Respiratory: Negative for shortness of breath. Cardiovascular: Negative for chest pain. Gastrointestinal: Positive for abdominal pain (lower). Negative for blood in stool, constipation, diarrhea, nausea and vomiting. Denies melena, denies hematochezia, denies hematemesis Endocrine: Negative for heat intolerance. Genitourinary: Positive for dysuria and hematuria. Musculoskeletal: Negative for back pain. Skin: Negative for rash. Allergic/Immunologic: Negative for immunocompromised state. Neurological: Negative for dizziness. Hematological: Does not bruise/bleed easily. Psychiatric/Behavioral: Negative. All other systems reviewed and are negative. Physical Exam  
Physical Exam  
Constitutional: She is oriented to person, place, and time. She appears well-developed and well-nourished. No distress. HENT:  
Head: Normocephalic and atraumatic. Eyes: EOM are normal. Pupils are equal, round, and reactive to light. Neck: Normal range of motion. Neck supple. Cardiovascular: Normal rate, regular rhythm and normal heart sounds. Pulmonary/Chest: Effort normal and breath sounds normal. No respiratory distress. Abdominal: Soft. Bowel sounds are normal. She exhibits no mass. There is tenderness (LLQ). Musculoskeletal: Normal range of motion. She exhibits no edema. Neurological: She is alert and oriented to person, place, and time. Coordination normal.  
Skin: Skin is warm and dry. Psychiatric: She has a normal mood and affect. Her behavior is normal.  
Nursing note and vitals reviewed. Diagnostic Study Results Labs - Recent Results (from the past 12 hour(s)) HCG URINE, QL. - POC Collection Time: 12/28/18  7:32 AM  
Result Value Ref Range Pregnancy test,urine (POC) NEGATIVE  NEG    
URINALYSIS W/ REFLEX CULTURE Collection Time: 12/28/18  7:33 AM  
Result Value Ref Range Color RED Appearance BLOODY (A) CLEAR Specific gravity 1.023 1.003 - 1.030    
 pH (UA) 6.0 5.0 - 8.0 Protein 300 (A) NEG mg/dL Glucose NEGATIVE  NEG mg/dL Ketone NEGATIVE  NEG mg/dL Bilirubin NEGATIVE  NEG Blood LARGE (A) NEG Urobilinogen 0.2 0.2 - 1.0 EU/dL Nitrites NEGATIVE  NEG Leukocyte Esterase TRACE (A) NEG    
 WBC 20-50 0 - 4 /hpf  
 RBC >100 (H) 0 - 5 /hpf Epithelial cells MODERATE (A) FEW /lpf Bacteria 1+ (A) NEG /hpf  
 UA:UC IF INDICATED URINE CULTURE ORDERED (A) CNI Hyaline cast 0-2 0 - 5 /lpf  
CBC WITH AUTOMATED DIFF  Collection Time: 12/28/18  8:09 AM  
 Result Value Ref Range WBC 10.1 3.6 - 11.0 K/uL  
 RBC 4.91 3.80 - 5.20 M/uL  
 HGB 13.8 11.5 - 16.0 g/dL HCT 40.6 35.0 - 47.0 % MCV 82.7 80.0 - 99.0 FL  
 MCH 28.1 26.0 - 34.0 PG  
 MCHC 34.0 30.0 - 36.5 g/dL  
 RDW 12.7 11.5 - 14.5 % PLATELET 388 781 - 221 K/uL MPV 9.4 8.9 - 12.9 FL  
 NRBC 0.0 0  WBC ABSOLUTE NRBC 0.00 0.00 - 0.01 K/uL NEUTROPHILS 72 32 - 75 % LYMPHOCYTES 18 12 - 49 % MONOCYTES 6 5 - 13 % EOSINOPHILS 4 0 - 7 % BASOPHILS 0 0 - 1 % IMMATURE GRANULOCYTES 0 0.0 - 0.5 % ABS. NEUTROPHILS 7.2 1.8 - 8.0 K/UL  
 ABS. LYMPHOCYTES 1.8 0.8 - 3.5 K/UL  
 ABS. MONOCYTES 0.6 0.0 - 1.0 K/UL  
 ABS. EOSINOPHILS 0.4 0.0 - 0.4 K/UL  
 ABS. BASOPHILS 0.0 0.0 - 0.1 K/UL  
 ABS. IMM. GRANS. 0.0 0.00 - 0.04 K/UL  
 DF AUTOMATED METABOLIC PANEL, COMPREHENSIVE Collection Time: 12/28/18  8:09 AM  
Result Value Ref Range Sodium 132 (L) 136 - 145 mmol/L Potassium 3.9 3.5 - 5.1 mmol/L Chloride 97 97 - 108 mmol/L  
 CO2 23 21 - 32 mmol/L Anion gap 12 5 - 15 mmol/L Glucose 268 (H) 65 - 100 mg/dL BUN 10 6 - 20 MG/DL Creatinine 0.80 0.55 - 1.02 MG/DL  
 BUN/Creatinine ratio 13 12 - 20 GFR est AA >60 >60 ml/min/1.73m2 GFR est non-AA >60 >60 ml/min/1.73m2 Calcium 8.7 8.5 - 10.1 MG/DL Bilirubin, total 0.5 0.2 - 1.0 MG/DL  
 ALT (SGPT) 47 12 - 78 U/L  
 AST (SGOT) 17 15 - 37 U/L Alk. phosphatase 52 45 - 117 U/L Protein, total 7.3 6.4 - 8.2 g/dL Albumin 3.6 3.5 - 5.0 g/dL Globulin 3.7 2.0 - 4.0 g/dL A-G Ratio 1.0 (L) 1.1 - 2.2 LIPASE Collection Time: 12/28/18  8:09 AM  
Result Value Ref Range Lipase 134 73 - 393 U/L Radiologic Studies -  
CT ABD PELV WO CONT Final Result IMPRESSION:  
Punctate nonobstructing left renal stone. No ureteral stone or hydronephrosis. Hepatic steatosis. No acute abnormality. CT Results  (Last 48 hours) 12/28/18 0816  CT ABD PELV WO CONT Final result Impression:  IMPRESSION:  
Punctate nonobstructing left renal stone. No ureteral stone or hydronephrosis. Hepatic steatosis. No acute abnormality. Narrative:  EXAM: CT ABD PELV WO CONT INDICATION: Abdominal pain; hematuria COMPARISON: None CONTRAST:  None. TECHNIQUE:   
Thin axial images were obtained through the abdomen and pelvis. Coronal and  
sagittal reconstructions were generated. Oral contrast was not administered. CT  
dose reduction was achieved through use of a standardized protocol tailored for  
this examination and automatic exposure control for dose modulation. The absence of intravenous contrast material reduces the sensitivity for  
evaluation of the solid parenchymal organs of the abdomen. FINDINGS:   
LUNG BASES: Clear. INCIDENTALLY IMAGED HEART AND MEDIASTINUM: Unremarkable. LIVER: Hepatic steatosis is noted. GALLBLADDER: Unremarkable. SPLEEN: No mass. PANCREAS: No mass or ductal dilatation. ADRENALS: Unremarkable. KIDNEYS/URETERS: There is a punctate nonobstructing stone in the left kidney. No  
ureteral stone or hydronephrosis. STOMACH: Unremarkable. SMALL BOWEL: No dilatation or wall thickening. COLON: No dilatation or wall thickening. APPENDIX: Unremarkable. PERITONEUM: No ascites or pneumoperitoneum. RETROPERITONEUM: No lymphadenopathy or aortic aneurysm. REPRODUCTIVE ORGANS: There is no pelvic mass or lymphadenopathy. URINARY BLADDER: No mass or calculus. BONES: No destructive bone lesion. ADDITIONAL COMMENTS: N/A Medical Decision Making I am the first provider for this patient. I reviewed the vital signs, available nursing notes, past medical history, past surgical history, family history and social history. Vital Signs-Reviewed the patient's vital signs. Patient Vitals for the past 12 hrs: 
 Temp Pulse Resp BP SpO2  
12/28/18 0725 98 °F (36.7 °C) 84 14 147/80 97 % Pulse Oximetry Analysis - 97% on RA Cardiac Monitor:  
Rate: 84 bpm 
Rhythm: Normal Sinus Rhythm Records Reviewed: Nursing Notes, Old Medical Records, Previous Radiology Studies and Previous Laboratory Studies Provider Notes (Medical Decision Making): DDx: hematuria, UTI, kidney stone, ovarian cyst 
 
ED Course:  
Initial assessment performed. The patients presenting problems have been discussed, and they are in agreement with the care plan formulated and outlined with them. I have encouraged them to ask questions as they arise throughout their visit. Critical Care Time:  
0 minutes Disposition: 
Discharge Note: 
9:00 AM 
The pt is ready for discharge. The pt's signs, symptoms, diagnosis, and discharge instructions have been discussed and pt has conveyed their understanding. The pt is to follow up as recommended or return to ER should their symptoms worsen. Plan has been discussed and pt is in agreement. PLAN: 
1. Discharge Medication List as of 12/28/2018  8:59 AM  
  
START taking these medications Details  
cephALEXin (KEFLEX) 500 mg capsule Take 1 Cap by mouth three (3) times daily for 7 days. , Normal, Disp-21 Cap, R-0  
  
phenazopyridine (PYRIDIUM) 200 mg tablet Take 1 Tab by mouth three (3) times daily for 6 doses. , Normal, Disp-6 Tab, R-0  
  
  
CONTINUE these medications which have NOT CHANGED Details  
glucose blood VI test strips (ONETOUCH ULTRA TEST) strip Check BS BID as directed., Normal, Disp-100 Strip, R-1  
  
coenzyme q10-vitamin e (COQ10 ) 100-100 mg-unit cap Take  by mouth., Historical Med  
  
multivitamin (ONE A DAY) tablet Take 1 Tab by mouth daily. , Historical Med  
  
B.infantis-B.ani-B.long-B.bifi (PROBIOTIC 4X) 10-15 mg TbEC Take  by mouth., Historical Med Lancets misc Check BS as directed, Normal, Disp-100 Each, R-1  
  
naproxen sodium (ALEVE) 220 mg cap Take 1 capsule by mouth as needed., Historical Med  
  
 Cetirizine (ZYRTEC) 10 mg cap Take 1 capsule by mouth as needed., Historical Med 2. Follow-up Information Follow up With Specialties Details Why Contact Info Eric Liriano MD Internal Medicine In 3 days As needed [address] 
[phone] Andrade Zamarripa MD Urology  As needed [address] [address] [address] [facility] 
[phone] [facility] EMERGENCY DEPT Emergency Medicine  If symptoms worsen [address] [facility] 
[phone] Return to ED if worse Diagnosis Clinical Impression: 1. Acute cystitis with hematuria 2. Kidney stone on left side Attestations: This note is prepared by Zuleika Rowan. Sarita Cha, acting as Scribe for Jennifer Pineda MD. 
 
Jennifer Pineda MD: The scribe's documentation has been prepared under my direction and personally reviewed by me in its entirety. I confirm that the note above accurately reflects all work, treatment, procedures, and medical decision making performed by me. This note will not be viewable in [address]

## 2018-12-30 LAB
BACTERIA SPEC CULT: ABNORMAL
CC UR VC: ABNORMAL
SERVICE CMNT-IMP: ABNORMAL

## 2019-02-06 ENCOUNTER — OFFICE VISIT (OUTPATIENT)
Dept: URGENT CARE | Age: 49
End: 2019-02-06

## 2019-02-06 VITALS
RESPIRATION RATE: 16 BRPM | HEIGHT: 61 IN | SYSTOLIC BLOOD PRESSURE: 142 MMHG | BODY MASS INDEX: 31.34 KG/M2 | WEIGHT: 166 LBS | HEART RATE: 97 BPM | OXYGEN SATURATION: 97 % | TEMPERATURE: 98.2 F | DIASTOLIC BLOOD PRESSURE: 83 MMHG

## 2019-02-06 DIAGNOSIS — R68.89 FLU-LIKE SYMPTOMS: Primary | ICD-10-CM

## 2019-02-06 LAB
FLUAV+FLUBV AG NOSE QL IA.RAPID: NEGATIVE POS/NEG
FLUAV+FLUBV AG NOSE QL IA.RAPID: NEGATIVE POS/NEG
VALID INTERNAL CONTROL?: YES

## 2019-02-06 RX ORDER — BENZONATATE 200 MG/1
200 CAPSULE ORAL
Qty: 21 CAP | Refills: 0 | Status: SHIPPED | OUTPATIENT
Start: 2019-02-06 | End: 2019-02-13

## 2019-02-06 RX ORDER — FLUTICASONE PROPIONATE 50 MCG
2 SPRAY, SUSPENSION (ML) NASAL DAILY
Qty: 1 BOTTLE | Refills: 0 | Status: SHIPPED | OUTPATIENT
Start: 2019-02-06

## 2019-02-07 NOTE — PROGRESS NOTES
Cold Symptoms   The history is provided by the patient. This is a new problem. The cough is non-productive. There has been no fever. Associated symptoms include chills, headaches, sore throat and myalgias. She has tried nothing for the symptoms. She is not a smoker. Past Medical History:   Diagnosis Date    Diabetes mellitus     GDM- diet controlled    Diabetes mellitus type 2, controlled ([address] 2015        Past Surgical History:   Procedure Laterality Date    HX GYN                Family History   Problem Relation Age of Onset    Cancer Mother         lung    Hypertension Father     Cancer Maternal Grandfather         lung        Social History     Socioeconomic History    Marital status:      Spouse name: Not on file    Number of children: Not on file    Years of education: Not on file    Highest education level: Not on file   Social Needs    Financial resource strain: Not on file    Food insecurity - worry: Not on file    Food insecurity - inability: Not on file   [employer] needs - medical: Not on file   [employer] needs - non-medical: Not on file   Occupational History    Occupation:      Employer: [employer]   Tobacco Use    Smoking status: Never Smoker    Smokeless tobacco: Never Used   Substance and Sexual Activity    Alcohol use: No     Alcohol/week: 0.0 oz    Drug use: No    Sexual activity: Yes     Partners: Male     Birth control/protection: None     Comment: single   Other Topics Concern    Not on file   Social History Narrative    Not on file                ALLERGIES: Patient has no known allergies. Review of Systems   Constitutional: Positive for chills. HENT: Positive for congestion and sore throat. Musculoskeletal: Positive for myalgias. Neurological: Positive for headaches. All other systems reviewed and are negative.       Vitals:    19 1902   BP: 142/83   Pulse: 97   Resp: 16   Temp: 98.2 °F (36.8 °C) SpO2: 97%   Weight: 166 lb (75.3 kg)   Height: 5' 1\" (1.549 m)       Physical Exam   Constitutional: No distress. HENT:   Right Ear: Tympanic membrane and ear canal normal.   Left Ear: Tympanic membrane and ear canal normal.   Nose: Nose normal.   Mouth/Throat: No oropharyngeal exudate, posterior oropharyngeal edema or posterior oropharyngeal erythema. Eyes: Conjunctivae are normal. Right eye exhibits no discharge. Left eye exhibits no discharge. Neck: Neck supple. Pulmonary/Chest: Effort normal and breath sounds normal. No respiratory distress. She has no wheezes. She has no rales. Lymphadenopathy:     She has no cervical adenopathy. Skin: No rash noted. Nursing note and vitals reviewed. MDM    Procedures      ICD-10-CM ICD-9-CM    1. Flu-like symptoms R68.89 780.99 AMB POC BRIANA INFLUENZA A/B TEST    rapid flu- negative   Mucinex Fast max 2 tab 4 times/ day   Motrin 800 mg tid     Medications Ordered Today   Medications    benzonatate (TESSALON) 200 mg capsule     Sig: Take 1 Cap by mouth three (3) times daily as needed for Cough for up to 7 days. Dispense:  21 Cap     Refill:  0    fluticasone (FLONASE) 50 mcg/actuation nasal spray     Si Sprays by Both Nostrils route daily. Dispense:  1 Bottle     Refill:  0     Results for orders placed or performed in visit on 19   AMB POC BRIANA INFLUENZA A/B TEST   Result Value Ref Range    VALID INTERNAL CONTROL POC Yes     Influenza A Ag POC Negative Negative Pos/Neg    Influenza B Ag POC Negative Negative Pos/Neg     The patients condition was discussed with the patient and they understand. The patient is to follow up with primary care doctor. If signs and symptoms become worse the pt is to go to the ER. The patient is to take medications as prescribed.

## 2019-02-22 ENCOUNTER — HOSPITAL ENCOUNTER (OUTPATIENT)
Dept: CT IMAGING | Age: 49
Discharge: HOME | End: 2019-02-22
Attending: OTOLARYNGOLOGY
Payer: COMMERCIAL

## 2019-02-22 DIAGNOSIS — J35.01 CHRONIC TONSILLITIS: ICD-10-CM

## 2019-02-22 DIAGNOSIS — R22.1 NECK MASS: ICD-10-CM

## 2019-02-22 PROCEDURE — 70491 CT SOFT TISSUE NECK W/DYE: CPT

## 2019-02-22 PROCEDURE — 74011636320 HC RX REV CODE- 636/320: Performed by: OTOLARYNGOLOGY

## 2019-02-22 RX ORDER — SODIUM CHLORIDE 0.9 % (FLUSH) 0.9 %
10 SYRINGE (ML) INJECTION
Status: COMPLETED | OUTPATIENT
Start: 2019-02-22 | End: 2019-02-22

## 2019-02-22 RX ADMIN — Medication 10 ML: at 08:34

## 2019-02-22 RX ADMIN — IOPAMIDOL 100 ML: 755 INJECTION, SOLUTION INTRAVENOUS at 08:34

## 2019-03-01 ENCOUNTER — TELEPHONE (OUTPATIENT)
Dept: INTERNAL MEDICINE CLINIC | Age: 49
End: 2019-03-01

## 2019-03-01 NOTE — TELEPHONE ENCOUNTER
Notify patient due now for follow-up on her diabetes and lab work. She has an appointment in July but that is too far out. She needs a follow-up appointment now.

## 2019-07-30 ENCOUNTER — TELEPHONE (OUTPATIENT)
Dept: INTERNAL MEDICINE CLINIC | Age: 49
End: 2019-07-30

## 2019-07-30 NOTE — TELEPHONE ENCOUNTER
Attempted to reach patient on her cell (no alternative numbers) she did not answer and her VM box is full. Will try again later.

## 2019-07-30 NOTE — TELEPHONE ENCOUNTER
Call patient and find out if she is seeing another physician. She had a physical today which she did not come for. She has diabetes and last diabetic lab work that we have for her was from 2015. I am concerned that her diabetes is not being managed. If she does continue to plan to be a patient here please make her an appointment for this week or next in an acute slot for a follow-up for her diabetes. Please asked her to come fasting so that we can obtain her lab work the day that she is seen for we can print her a lab slip so that she can have her lab work done prior to her visit. Shaq Dietz

## 2019-08-01 NOTE — TELEPHONE ENCOUNTER
Suhas Hernandez Other Relative [phone]     Message left for emergency  asking to have him give her the message to contact our office.

## 2019-12-20 ENCOUNTER — OFFICE VISIT (OUTPATIENT)
Dept: INTERNAL MEDICINE CLINIC | Age: 49
End: 2019-12-20

## 2019-12-20 VITALS
BODY MASS INDEX: 30.96 KG/M2 | DIASTOLIC BLOOD PRESSURE: 84 MMHG | HEIGHT: 61 IN | SYSTOLIC BLOOD PRESSURE: 116 MMHG | RESPIRATION RATE: 18 BRPM | TEMPERATURE: 97.7 F | WEIGHT: 164 LBS | OXYGEN SATURATION: 98 % | HEART RATE: 63 BPM

## 2019-12-20 DIAGNOSIS — Z00.00 ROUTINE GENERAL MEDICAL EXAMINATION AT A HEALTH CARE FACILITY: Primary | ICD-10-CM

## 2019-12-20 DIAGNOSIS — E11.9 CONTROLLED TYPE 2 DIABETES MELLITUS WITHOUT COMPLICATION, WITHOUT LONG-TERM CURRENT USE OF INSULIN (HCC): ICD-10-CM

## 2019-12-20 DIAGNOSIS — E78.5 HYPERLIPIDEMIA, UNSPECIFIED HYPERLIPIDEMIA TYPE: ICD-10-CM

## 2019-12-20 DIAGNOSIS — J30.1 SEASONAL ALLERGIC RHINITIS DUE TO POLLEN: ICD-10-CM

## 2019-12-20 DIAGNOSIS — M62.838 CERVICAL PARASPINAL MUSCLE SPASM: ICD-10-CM

## 2019-12-20 DIAGNOSIS — Z23 ENCOUNTER FOR IMMUNIZATION: ICD-10-CM

## 2019-12-20 RX ORDER — ACETAMINOPHEN 325 MG/1
TABLET ORAL
COMMUNITY

## 2019-12-20 NOTE — PROGRESS NOTES
Subjective: Tommy Martinez is here today for a full physical.      Health Maintenance  Immunizations:    Influenza: up to date. Tetanus: up to date. .   Cancer screening:    Cervical: reviewed guidelines, UTD, done by OBGYN, records requested. Breast: reviewed guidelines, reviewed SBE with her, UTD, done by OBGYN, records requested  Exercise walking and diabetic diet. Has lost 6 pounds    Patient Care Team:  Thomas Weiss MD as PCP - [facility] Kait Langford MD as PCP - [facility] Empaneled Provider  Villa Schirmer, MD as Physician (Obstetrics & Gynecology)  Kaity Louis MD as Consulting Provider (Endocrinology)     The following sections were reviewed & updated as appropriate: PMH, PSH, FH, and SH. Also here for follow up of medical problems. Patient Active Problem List   Diagnosis Code    Allergic rhinitis, seasonal  Controlled with flonase and zyrtec J30.2    Diabetes mellitus type 2, controlled ([address]  Not checking because out of test strips. Following diabetic diet. E11.9    Hyperlipidemia  E78.5          Prior to Admission medications    Medication Sig Start Date End Date Taking? Authorizing Provider   acetaminophen (TYLENOL) 325 mg tablet Take  by mouth every four (4) hours as needed for Pain. Yes Provider, Historical   glucose blood VI test strips (ONETOUCH ULTRA TEST) strip Check BS BID as directed. 2/16/16  Yes Thomas Weiss MD   coenzyme q10-vitamin e (COQ10 ) 100-100 mg-unit cap Take  by mouth. Yes Provider, Historical   multivitamin (ONE A DAY) tablet Take 1 Tab by mouth daily. Yes Provider, Historical   B.infantis-B.ani-B.long-B.bifi (PROBIOTIC 4X) 10-15 mg TbEC Take  by mouth. Yes Provider, Historical   Lancets misc Check BS as directed 7/20/15  Yes Thomas Weiss MD   fluticasone [facility]) 50 mcg/actuation nasal spray 2 Sprays by Both Nostrils route daily. 2/6/19   Patrick Dyer MD   naproxen sodium (ALEVE) 220 mg cap Take 1 capsule by mouth as needed.     Provider, Historical   Cetirizine (ZYRTEC) 10 mg cap Take 1 capsule by mouth as needed. Provider, Historical      No Known Allergies      Review of Systems   Constitutional: Negative for chills, fever and malaise/fatigue. HENT: Negative for congestion and sore throat. Eyes: Negative for blurred vision and double vision. Respiratory: Negative for cough, shortness of breath and wheezing. Cardiovascular: Negative for chest pain, palpitations and leg swelling. Gastrointestinal: Negative for abdominal pain, blood in stool, constipation, diarrhea, heartburn, nausea and vomiting. Genitourinary: Negative for dysuria, frequency and urgency. Musculoskeletal: Positive for neck pain (Some on and off tightness in the right side of her neck. She had a full ENT evaluation which was negative. Including a CT scan which was per patient normal.). Negative for back pain, joint pain and myalgias. Skin: Negative for rash. Neurological: Negative for dizziness, sensory change, focal weakness and headaches. Psychiatric/Behavioral: Negative for depression. The patient is not nervous/anxious and does not have insomnia. Objective:   Physical Exam  Vitals signs and nursing note reviewed. Constitutional:       General: She is not in acute distress. Appearance: She is well-developed. HENT:      Head: Normocephalic and atraumatic. Right Ear: Tympanic membrane and ear canal normal.      Left Ear: Tympanic membrane and ear canal normal.      Nose: Nose normal.   Eyes:      Conjunctiva/sclera: Conjunctivae normal.      Pupils: Pupils are equal, round, and reactive to light. Neck:      Musculoskeletal: Normal range of motion. Thyroid: No thyromegaly. Cardiovascular:      Rate and Rhythm: Normal rate and regular rhythm. Heart sounds: Normal heart sounds. No murmur. Pulmonary:      Effort: Pulmonary effort is normal.      Breath sounds: Normal breath sounds.    Abdominal:      General: Bowel sounds are normal.      Palpations: Abdomen is soft. There is no mass. Tenderness: There is no tenderness. Musculoskeletal:      Right lower leg: No edema. Left lower leg: No edema. Lymphadenopathy:      Cervical: No cervical adenopathy. Skin:     Findings: No rash. Neurological:      Cranial Nerves: No cranial nerve deficit. Sensory: No sensory deficit. Monofilament intact bilaterally. Pulses 2+ bilaterally. No skin lesions or open wounds. Visit Vitals  /84 (BP 1 Location: Right arm)   Pulse 63   Temp 97.7 °F (36.5 °C) (Oral)   Resp 18   Ht 5' 1\" (1.549 m)   Wt 164 lb (74.4 kg)   LMP 12/04/2019   SpO2 98%   BMI 30.99 kg/m²         Yessy Hatch is a 54 y.o. female who was seen in clinic today (12/20/2019) for a full physical.       Assessment & Plan:   Encounter Diagnoses   Name Primary?  Routine general medical examination at a health care facility  Health maintenance reviewed and updated with patient today at visit. Yes    Controlled type 2 diabetes mellitus without complication, without long-term current use of insulin ([address]  Check lab work and continue with diabetic diet.  Hyperlipidemia, unspecified hyperlipidemia type  Continue with lifestyle management and check lab work.  Seasonal allergic rhinitis due to pollen  Continue current medications well controlled.  Cervical paraspinal muscle spasm  Given back exercises to do.  Encounter for immunization      Orders Placed This Encounter    PNEUMOCOCCAL POLYSACCHARIDE VACCINE, 23-VALENT, ADULT OR IMMUNOSUPPRESSED PT DOSE,    CBC WITH AUTOMATED DIFF    METABOLIC PANEL, COMPREHENSIVE    LIPID PANEL    HEMOGLOBIN A1C WITH EAG    MICROALBUMIN, UR, RAND W/ MICROALB/CREAT RATIO    TSH REFLEX TO T4   .   Follow-up 6 months earlier if lab work not at goal.    Advised her to call back or return to office if symptoms worsen/change/persist.  Discussed expected course/resolution/complications of diagnosis in detail with patient. Medication risks/benefits/costs/interactions/alternatives discussed with patient. She was given an after visit summary which includes diagnoses, current medications, & vitals. She expressed understanding with the diagnosis and plan. Aspects of this note may have been generated using voice recognition software. Despite editing, there may be some syntax errors.        Lorna Silva MD

## 2019-12-20 NOTE — PROGRESS NOTES
Patient is here for a physical. Patient states that she has been taking tylenol because she is getting headaches more often.

## 2019-12-20 NOTE — PATIENT INSTRUCTIONS
Neck Spasm: Exercises Introduction Here are some examples of exercises for you to try. The exercises may be suggested for a condition or for rehabilitation. Start each exercise slowly. Ease off the exercises if you start to have pain. You will be told when to start these exercises and which ones will work best for you. How to do the exercises Levator scapula stretch 1. Sit in a firm chair, or stand up straight. 2. Gently tilt your head toward your left shoulder. 3. Turn your head to look down into your armpit, bending your head slightly forward. Let the weight of your head stretch your neck muscles. 4. Hold for 15 to 30 seconds. 5. Return to your starting position. 6. Follow the same instructions above, but tilt your head toward your right shoulder. 7. Repeat 2 to 4 times toward each shoulder. Upper trapezius stretch 1. Sit in a firm chair, or stand up straight. 2. This stretch works best if you keep your shoulder down as you lean away from it. To help you remember to do this, start by relaxing your shoulders and lightly holding on to your thighs or your chair. 3. Tilt your head toward your shoulder and hold for 15 to 30 seconds. Let the weight of your head stretch your muscles. 4. If you would like a little added stretch, place your arm behind your back. Use the arm opposite of the direction you are tilting your head. For example, if you are tilting your head to the left, place your right arm behind your back. 5. Repeat 2 to 4 times toward each shoulder. Neck rotation 1. Sit in a firm chair, or stand up straight. 2. Keeping your chin level, turn your head to the right, and hold for 15 to 30 seconds. 3. Turn your head to the left, and hold for 15 to 30 seconds. 4. Repeat 2 to 4 times to each side. Chin tuck 1. Lie on the floor with a rolled-up towel under your neck. Your head should be touching the floor. 2. Slowly bring your chin toward the front of your neck. 3. Hold for a count of 6, and then relax for up to 10 seconds. 4. Repeat 8 to 12 times. Forward neck flexion 1. Sit in a firm chair, or stand up straight. 2. Bend your head forward. 3. Hold for 15 to 30 seconds, then return to your starting position. 4. Repeat 2 to 4 times. Follow-up care is a key part of your treatment and safety. Be sure to make and go to all appointments, and call your doctor if you are having problems. It's also a good idea to know your test results and keep a list of the medicines you take. Where can you learn more? Go to http://fredi-luis.info/. Enter P962 in the search box to learn more about \"Neck Spasm: Exercises. \" Current as of: June 26, 2019 Content Version: 12.2 © 3469-5752 [employer]. Care instructions adapted under license by [employer] (which disclaims liability or warranty for this information). If you have questions about a medical condition or this instruction, always ask your healthcare professional. [address] any warranty or liability for your use of this information.

## 2019-12-21 LAB
ALBUMIN SERPL-MCNC: 4.5 G/DL (ref 3.5–5.5)
ALBUMIN/CREAT UR: 37.4 MG/G CREAT (ref 0–30)
ALBUMIN/GLOB SERPL: 1.9 {RATIO} (ref 1.2–2.2)
ALP SERPL-CCNC: 49 IU/L (ref 39–117)
ALT SERPL-CCNC: 45 IU/L (ref 0–32)
AST SERPL-CCNC: 28 IU/L (ref 0–40)
BASOPHILS # BLD AUTO: 0.1 X10E3/UL (ref 0–0.2)
BASOPHILS NFR BLD AUTO: 1 %
BILIRUB SERPL-MCNC: 0.5 MG/DL (ref 0–1.2)
BUN SERPL-MCNC: 12 MG/DL (ref 6–24)
BUN/CREAT SERPL: 23 (ref 9–23)
CALCIUM SERPL-MCNC: 9.3 MG/DL (ref 8.7–10.2)
CHLORIDE SERPL-SCNC: 94 MMOL/L (ref 96–106)
CHOLEST SERPL-MCNC: 270 MG/DL (ref 100–199)
CO2 SERPL-SCNC: 23 MMOL/L (ref 20–29)
CREAT SERPL-MCNC: 0.52 MG/DL (ref 0.57–1)
CREAT UR-MCNC: 111.5 MG/DL
EOSINOPHIL # BLD AUTO: 0.4 X10E3/UL (ref 0–0.4)
EOSINOPHIL NFR BLD AUTO: 7 %
ERYTHROCYTE [DISTWIDTH] IN BLOOD BY AUTOMATED COUNT: 12.6 % (ref 12.3–15.4)
EST. AVERAGE GLUCOSE BLD GHB EST-MCNC: 280 MG/DL
GLOBULIN SER CALC-MCNC: 2.4 G/DL (ref 1.5–4.5)
GLUCOSE SERPL-MCNC: 252 MG/DL (ref 65–99)
HBA1C MFR BLD: 11.4 % (ref 4.8–5.6)
HCT VFR BLD AUTO: 41.9 % (ref 34–46.6)
HDLC SERPL-MCNC: 54 MG/DL
HGB BLD-MCNC: 14.1 G/DL (ref 11.1–15.9)
IMM GRANULOCYTES # BLD AUTO: 0 X10E3/UL (ref 0–0.1)
IMM GRANULOCYTES NFR BLD AUTO: 0 %
LDLC SERPL CALC-MCNC: 178 MG/DL (ref 0–99)
LYMPHOCYTES # BLD AUTO: 2.1 X10E3/UL (ref 0.7–3.1)
LYMPHOCYTES NFR BLD AUTO: 33 %
MCH RBC QN AUTO: 28.3 PG (ref 26.6–33)
MCHC RBC AUTO-ENTMCNC: 33.7 G/DL (ref 31.5–35.7)
MCV RBC AUTO: 84 FL (ref 79–97)
MICROALBUMIN UR-MCNC: 41.7 UG/ML
MONOCYTES # BLD AUTO: 0.4 X10E3/UL (ref 0.1–0.9)
MONOCYTES NFR BLD AUTO: 7 %
NEUTROPHILS # BLD AUTO: 3.4 X10E3/UL (ref 1.4–7)
NEUTROPHILS NFR BLD AUTO: 52 %
PLATELET # BLD AUTO: 269 X10E3/UL (ref 150–450)
POTASSIUM SERPL-SCNC: 4.4 MMOL/L (ref 3.5–5.2)
PROT SERPL-MCNC: 6.9 G/DL (ref 6–8.5)
RBC # BLD AUTO: 4.99 X10E6/UL (ref 3.77–5.28)
SODIUM SERPL-SCNC: 131 MMOL/L (ref 134–144)
TRIGL SERPL-MCNC: 190 MG/DL (ref 0–149)
TSH SERPL DL<=0.005 MIU/L-ACNC: 3.59 UIU/ML (ref 0.45–4.5)
VLDLC SERPL CALC-MCNC: 38 MG/DL (ref 5–40)
WBC # BLD AUTO: 6.4 X10E3/UL (ref 3.4–10.8)

## 2020-01-07 ENCOUNTER — TELEPHONE (OUTPATIENT)
Dept: INTERNAL MEDICINE CLINIC | Age: 50
End: 2020-01-07

## 2020-01-07 RX ORDER — METFORMIN HYDROCHLORIDE 500 MG/1
500 TABLET, EXTENDED RELEASE ORAL
Qty: 90 TAB | Refills: 0 | Status: SHIPPED | OUTPATIENT
Start: 2020-01-07 | End: 2020-02-07 | Stop reason: SDUPTHER

## 2020-01-07 NOTE — TELEPHONE ENCOUNTER
Notify patient that blood sugar is extremely high and hemoglobin A1c is 11.4. Our goal for her hemoglobin A1c is less than 7. Her urine shows evidence of protein which indicates that her diabetes is having an effect on her kidneys. Thyroid test looks fine. I would like her to start diabetes medication metformin  mg at dinner and med sent to pharmacy. Please contact me if side effects. Main side effects is diarrhea. Also recommend following a low sugar low carb diabetic diet. I would like her to start checking her BS 1 x a day every day alternating the times checking before breakfast, lunch and dinner and bedtime. Make sure she has a monitor and I did order this at her recent visit. Needs appt with Chloe JOSEPH for diabetic education. Send BS in 2 weeks for me to review. appt in 4 weeks to see me in follow up. Priya Timmons

## 2020-01-08 NOTE — TELEPHONE ENCOUNTER
Pt. Notified of Dr Corcoran Skill result note, will start metformin today, wants to see Jaiden Farnsworth 1/14/20 8am. Instructed to bring her glucometer, readings, food diary.

## 2020-01-14 ENCOUNTER — OFFICE VISIT (OUTPATIENT)
Dept: INTERNAL MEDICINE CLINIC | Age: 50
End: 2020-01-14

## 2020-01-14 VITALS — BODY MASS INDEX: 30.5 KG/M2 | WEIGHT: 161.4 LBS

## 2020-01-14 DIAGNOSIS — Z71.89 DIABETES EDUCATION, ENCOUNTER FOR: Primary | ICD-10-CM

## 2020-01-14 RX ORDER — LANCETS
EACH MISCELLANEOUS
Qty: 100 EACH | Refills: 1 | Status: SHIPPED | OUTPATIENT
Start: 2020-01-14 | End: 2020-04-06

## 2020-01-14 NOTE — PATIENT INSTRUCTIONS
1)  Keep up the exercise you are doing great    2)  Continue the monitoring but change to once daily (alternating times-fasting, before dinner, bedtime). Goals:  Fastin-130  2 hours after meal: less than 180  A1C: < 7%    3)  Work on one thing at a time, then move to the next meal.  Try keeping your carbs to portions of 15 grams/serving-look at your labels and the book I gave you    Www.diabetes. org for more info

## 2020-01-14 NOTE — Clinical Note
Thank you for consult. She will send you in readings next week and has follow-up with you scheduled.   Already made significant changes

## 2020-01-14 NOTE — PROGRESS NOTES
Diabetes    S/O: Michael Alcocer is a 54 y.o. female referred by Dr. Js Pierson for diabetes. Current diabetes regimen consists of the following: Metformin  mg at dinner (started last Thursday). Patient previously diet controlled but recent blood work showed large jump in A1C which patient attributes to weight gain and diet which was high in carb (rice). Patient reports frequent urination that started last year which patient thought might be hyperglycemia so she started exercising more but waited until follow-up with Dr. Js Pierson to get labwork drawn. Patient restarted checking blood sugars and since she used to check her blood sugars more frequently when she had Gestational diabetes has been checking more frequently than recommended by Dr. Js Pierson. She brought in her glucometer which revealed the followin [facility] Bedtime   Date Before After Before After Before After      Started metformin      238 211    267   201 208  241   1/10 226  145 201   186    197  213  101 200 159    130     164 153    200 166  172   180    171         All post-prandial readings are about 1 hour after a meal    Since finding out her blood sugar has significantly increased last week, she has cut out all pasta, rice, desserts, and cut back on carbs. Patient restarted looking at labels. A typical days food intake is as follows:  Breakfast: pkt of oatmeal mixed with water and milk,fruit and egg. Green green tea (no sugar) or warm water  Snacks: peanuts/pistachos (before honey roasted) or carrots or berries  Lunch: salad with tuna or chicken with broccoli, maybe leftovers  Snacks: peanuts/pistachos (before honey roasted) or carrots or berries  Dinner: Used to be at least 2 cups rice with meat, pasta- now meat/fish with steamed vegetables    Exercise:  Has treadmill at home. Currently she is walking at a rate of about 3.5 miles/h with some incline.   She walks 30 min in the morning, 20-30 min at lunch, and tries to walk after dinner 20-30 min. Been been doing over the past several months. Ultimate goal is 5 mile/day and get back to a slow jog. She loves yoga and is thinking about restarting. Patient works from home. Patient reports adherence with her medications. Since starting metformin, she did experience diarrhea over the weekend but it has since resolved. Patient asked if she could move the metformin to lunch as she would like this to be her largest meal of the day not dinner. Past Medical History:   Diagnosis Date    Diabetes mellitus     GDM- diet controlled    Diabetes mellitus type 2, controlled ([facility] [address] 2015     Past Surgical History:   Procedure Laterality Date    HX GYN            Family History   Problem Relation Age of Onset    Cancer Mother         lung    Hypertension Father     Cancer Maternal Grandfather         lung     Social History     Socioeconomic History    Marital status:      Spouse name: Not on file    Number of children: Not on file    Years of education: Not on file    Highest education level: Not on file   Occupational History    Occupation:      Employer: [employer]   Tobacco Use    Smoking status: Never Smoker    Smokeless tobacco: Never Used   Substance and Sexual Activity    Alcohol use: No     Alcohol/week: 0.0 standard drinks    Drug use: No    Sexual activity: Yes     Partners: Male     Birth control/protection: None     Comment: single     No Known Allergies  Current Outpatient Medications   Medication Sig    metFORMIN ER (GLUCOPHAGE XR) 500 mg tablet Take 1 Tab by mouth daily (with dinner).  acetaminophen (TYLENOL) 325 mg tablet Take  by mouth every four (4) hours as needed for Pain.  glucose blood VI test strips (BLOOD GLUCOSE TEST) strip Pharmacist to choose preferred meter and strips. Dx:    glucose blood VI test strips (ONETOUCH ULTRA TEST) strip Check BS BID as directed.     coenzyme q10-vitamin e (COQ10 ) 100-100 mg-unit cap Take 2 Caps by mouth daily.  multivitamin (ONE A DAY) tablet Take 1 Tab by mouth daily.  B.infantis-B.ani-B.long-B.bifi (PROBIOTIC 4X) 10-15 mg TbEC Take 3 Caps by mouth daily.  Lancets misc Check BS as directed    naproxen sodium (ALEVE) 220 mg cap Take 1 Cap by mouth daily as needed.  fluticasone (FLONASE) 50 mcg/actuation nasal spray 2 Sprays by Both Nostrils route daily.  Cetirizine (ZYRTEC) 10 mg cap Take 1 Cap by mouth daily as needed. No current facility-administered medications for this visit. Visit Vitals  Wt 161 lb 6.4 oz (73.2 kg)   BMI 30.50 kg/m²       Data reviewed:  Lab Results   Component Value Date/Time    Hemoglobin A1c 11.4 (H) 12/20/2019 10:01 AM    Hemoglobin A1c (POC) 6.6 12/03/2015 12:21 PM     Lab Results   Component Value Date/Time    Cholesterol, total 270 (H) 12/20/2019 10:01 AM    HDL Cholesterol 54 12/20/2019 10:01 AM    LDL, calculated 178 (H) 12/20/2019 10:01 AM    VLDL, calculated 38 12/20/2019 10:01 AM    Triglyceride 190 (H) 12/20/2019 10:01 AM    CHOL/HDL Ratio 2.2 04/15/2010 07:49 AM     Lab Results   Component Value Date/Time    ALT (SGPT) 45 (H) 12/20/2019 10:01 AM    AST (SGOT) 28 12/20/2019 10:01 AM    Alk. phosphatase 49 12/20/2019 10:01 AM    Bilirubin, total 0.5 12/20/2019 10:01 AM     Lab Results   Component Value Date/Time    Creatinine 0.52 (L) 12/20/2019 10:01 AM     Lab Results   Component Value Date/Time    Microalb/Creat ratio (ug/mg creat.) 37.4 (H) 12/20/2019 10:01 AM     Assessment/Plan:     1. Diabetes:  Uncontrolled (goal A1C at least <7%). Blood sugars starting to decrease with the addition of metformin and dietary changes. Discussed with patient appropriate servings of carb containing food as patient is not sure cutting out carbs to the extent that she has, especially with dinner, is sustainable.   She will use SMBG as a guide since she is used to checking her sugars, reviewed only needing to check once daily and if post-prandial to check after 2 hrs. Encouraged patient to continue with her exercise routine. I would continue titration of metformin. I did discuss with patient that she could move her dose to another meal if that was larger than dinner. Patient appears very motivated to continue with lifestyle changes, with staying healthy for her daughter as a motivating factor. Since it has been a while this this discussed, reviewed briefly the following:   Pathophysiology   Signs/sx of hyperglycemia   Signs/sx of hypoglycemia and treatment   DM-related complications and prevention   Blood sugar goals   Treatment (diet, exercise, medication)   Carbohydrates (foods that contain) and how they raise blood sugar (carbohydrate book given to patient)   How to read a food label and carbohydrate goals   Plate method    Exercise recommendations: at least 150 min/week or more for weight loss    Patient verbalized understanding of the information presented and all of the patients questions were answered. AVS was handed to the patient and information reviewed. Patient advised to call the office with any additional questions or concerns. Follow-up: PCP, will be sending in sugars next week. Notification of recommendations will be sent to Dr. Thomas Khan MD for review.     Thank you for the consult,  Jose Ruiz, PHARMD BCACP    Time spent with the patient:  100 min

## 2020-02-07 ENCOUNTER — OFFICE VISIT (OUTPATIENT)
Dept: INTERNAL MEDICINE CLINIC | Age: 50
End: 2020-02-07

## 2020-02-07 VITALS
RESPIRATION RATE: 18 BRPM | WEIGHT: 157 LBS | HEIGHT: 61 IN | DIASTOLIC BLOOD PRESSURE: 76 MMHG | TEMPERATURE: 98.2 F | BODY MASS INDEX: 29.64 KG/M2 | OXYGEN SATURATION: 97 % | HEART RATE: 73 BPM | SYSTOLIC BLOOD PRESSURE: 122 MMHG

## 2020-02-07 DIAGNOSIS — E78.5 HYPERLIPIDEMIA, UNSPECIFIED HYPERLIPIDEMIA TYPE: ICD-10-CM

## 2020-02-07 DIAGNOSIS — E11.9 TYPE 2 DIABETES MELLITUS WITHOUT COMPLICATION, WITHOUT LONG-TERM CURRENT USE OF INSULIN (HCC): Primary | ICD-10-CM

## 2020-02-07 RX ORDER — METFORMIN HYDROCHLORIDE 500 MG/1
1000 TABLET, EXTENDED RELEASE ORAL
Qty: 180 TAB | Refills: 0 | Status: SHIPPED | OUTPATIENT
Start: 2020-02-07 | End: 2020-04-14 | Stop reason: SDUPTHER

## 2020-02-07 RX ORDER — ROSUVASTATIN CALCIUM 20 MG/1
20 TABLET, COATED ORAL
Qty: 30 TAB | Refills: 0 | Status: SHIPPED | OUTPATIENT
Start: 2020-02-07 | End: 2020-03-13

## 2020-02-08 LAB
ALBUMIN SERPL-MCNC: 4.5 G/DL (ref 3.8–4.8)
ALBUMIN/GLOB SERPL: 1.8 {RATIO} (ref 1.2–2.2)
ALP SERPL-CCNC: 44 IU/L (ref 39–117)
ALT SERPL-CCNC: 31 IU/L (ref 0–32)
AST SERPL-CCNC: 16 IU/L (ref 0–40)
BILIRUB SERPL-MCNC: 0.2 MG/DL (ref 0–1.2)
BUN SERPL-MCNC: 21 MG/DL (ref 6–24)
BUN/CREAT SERPL: 40 (ref 9–23)
CALCIUM SERPL-MCNC: 9 MG/DL (ref 8.7–10.2)
CHLORIDE SERPL-SCNC: 100 MMOL/L (ref 96–106)
CO2 SERPL-SCNC: 21 MMOL/L (ref 20–29)
CREAT SERPL-MCNC: 0.52 MG/DL (ref 0.57–1)
GLOBULIN SER CALC-MCNC: 2.5 G/DL (ref 1.5–4.5)
GLUCOSE SERPL-MCNC: 169 MG/DL (ref 65–99)
POTASSIUM SERPL-SCNC: 4.4 MMOL/L (ref 3.5–5.2)
PROT SERPL-MCNC: 7 G/DL (ref 6–8.5)
SODIUM SERPL-SCNC: 135 MMOL/L (ref 134–144)

## 2020-03-23 ENCOUNTER — TELEPHONE (OUTPATIENT)
Dept: INTERNAL MEDICINE CLINIC | Age: 50
End: 2020-03-23

## 2020-03-23 NOTE — TELEPHONE ENCOUNTER
Left message for patient to return call. Rubina Garcia will not be in office tomorrow will ask pt if she wants virtual visit in the afternoon.

## 2020-04-02 ENCOUNTER — TELEPHONE (OUTPATIENT)
Dept: INTERNAL MEDICINE CLINIC | Age: 50
End: 2020-04-02

## 2020-04-02 NOTE — TELEPHONE ENCOUNTER
Pt. Notified she still has a current lab order, she's going to try to go to [facility] tomorrow morning and then will schedule telemed with Dr Pedro Holden.

## 2020-04-14 ENCOUNTER — PATIENT MESSAGE (OUTPATIENT)
Dept: INTERNAL MEDICINE CLINIC | Age: 50
End: 2020-04-14

## 2020-04-14 NOTE — TELEPHONE ENCOUNTER
Did not refill since Pt. Requesting to increase medication. Left her a VM to read her Mychart and send readings for Dr Roger Dejesus to review. Also asked if her cold symptoms have resolved.

## 2020-04-17 RX ORDER — METFORMIN HYDROCHLORIDE 500 MG/1
1000 TABLET, EXTENDED RELEASE ORAL
Qty: 180 TAB | Refills: 0 | Status: SHIPPED | OUTPATIENT
Start: 2020-04-17 | End: 2020-04-21

## 2020-04-17 NOTE — TELEPHONE ENCOUNTER
Left another message to either call back or respond on my chart, need her BS readings, telemed appt, and labs per Dr Neysa Meckel.

## 2020-04-17 NOTE — TELEPHONE ENCOUNTER
Pt is due for follow up. I need to know her BS to make dose adjustments. Schedule telemedicine appt and will discuss dose of her metformin at that visit and next lab work at that visit.

## 2020-04-21 RX ORDER — METFORMIN HYDROCHLORIDE 500 MG/1
1500 TABLET, EXTENDED RELEASE ORAL
Qty: 1 TAB | Refills: 0
Start: 2020-04-21 | End: 2020-05-12

## 2020-05-12 RX ORDER — METFORMIN HYDROCHLORIDE 500 MG/1
1500 TABLET, EXTENDED RELEASE ORAL
Qty: 270 TAB | Refills: 0 | Status: SHIPPED | OUTPATIENT
Start: 2020-05-12 | End: 2020-07-02 | Stop reason: SDUPTHER

## 2020-05-14 ENCOUNTER — PATIENT MESSAGE (OUTPATIENT)
Dept: INTERNAL MEDICINE CLINIC | Age: 50
End: 2020-05-14

## 2020-07-01 ENCOUNTER — TELEPHONE (OUTPATIENT)
Dept: INTERNAL MEDICINE CLINIC | Age: 50
End: 2020-07-01

## 2020-07-02 ENCOUNTER — OFFICE VISIT (OUTPATIENT)
Dept: INTERNAL MEDICINE CLINIC | Age: 50
End: 2020-07-02

## 2020-07-02 VITALS
TEMPERATURE: 97.6 F | RESPIRATION RATE: 18 BRPM | HEART RATE: 66 BPM | SYSTOLIC BLOOD PRESSURE: 116 MMHG | WEIGHT: 156 LBS | OXYGEN SATURATION: 99 % | HEIGHT: 61 IN | DIASTOLIC BLOOD PRESSURE: 71 MMHG | BODY MASS INDEX: 29.45 KG/M2

## 2020-07-02 DIAGNOSIS — J30.1 SEASONAL ALLERGIC RHINITIS DUE TO POLLEN: ICD-10-CM

## 2020-07-02 DIAGNOSIS — E11.9 TYPE 2 DIABETES MELLITUS WITHOUT COMPLICATION, WITHOUT LONG-TERM CURRENT USE OF INSULIN (HCC): Primary | ICD-10-CM

## 2020-07-02 DIAGNOSIS — E78.5 HYPERLIPIDEMIA, UNSPECIFIED HYPERLIPIDEMIA TYPE: ICD-10-CM

## 2020-07-02 RX ORDER — ROSUVASTATIN CALCIUM 20 MG/1
TABLET, COATED ORAL
Qty: 90 TAB | Refills: 1 | Status: SHIPPED | OUTPATIENT
Start: 2020-07-02 | End: 2020-08-25 | Stop reason: DRUGHIGH

## 2020-07-02 RX ORDER — METFORMIN HYDROCHLORIDE 500 MG/1
2000 TABLET, EXTENDED RELEASE ORAL
Qty: 360 TAB | Refills: 1 | Status: SHIPPED | OUTPATIENT
Start: 2020-07-02 | End: 2020-12-08

## 2020-07-02 NOTE — PROGRESS NOTES
HPI:  [facility] is a 52y.o. year old female who returns to clinic today for follow up:   Diabetes and hypercholesterolemia and allergies. .  BS: ranging 140-170. Following diabetic diet and tolerating metformin. Exercise walking. Trying to follow a healthy diet lower in fat and cholesterol. Tolerating medications with no side effects. Current Outpatient Medications   Medication Sig Dispense Refill    rosuvastatin (CRESTOR) 20 mg tablet TAKE 1 TABLET BY MOUTH EVERY NIGHT 30 Tab 0    metFORMIN ER (GLUCOPHAGE XR) 500 mg tablet Take 3 Tabs by mouth daily (with dinner). 270 Tab 0    lancets (Lancets,Ultra Thin) misc CHECK BLOOD SUGAR AS DIRECTED 100 Each 3    ONETOUCH ULTRA BLUE TEST STRIP strip CHECK BLOOD SUGAR TWICE DAILY 100 Strip 3    glucose blood VI test strips (BLOOD GLUCOSE TEST) strip Pharmacist to choose preferred meter and strips. Dx: 100 Strip 3    coenzyme q10-vitamin e (COQ10 ) 100-100 mg-unit cap Take 2 Caps by mouth daily.  multivitamin (ONE A DAY) tablet Take 1 Tab by mouth daily.  B.infantis-B.ani-B.long-B.bifi (PROBIOTIC 4X) 10-15 mg TbEC Take 3 Caps by mouth daily.  Cetirizine (ZYRTEC) 10 mg cap Take 1 Cap by mouth daily as needed.  acetaminophen (TYLENOL) 325 mg tablet Take  by mouth every four (4) hours as needed for Pain.  fluticasone (FLONASE) 50 mcg/actuation nasal spray 2 Sprays by Both Nostrils route daily. 1 Bottle 0    naproxen sodium (ALEVE) 220 mg cap Take 1 Cap by mouth daily as needed. No Known Allergies  Social History     Tobacco Use    Smoking status: Never Smoker    Smokeless tobacco: Never Used   Substance Use Topics    Alcohol use: No     Alcohol/week: 0.0 standard drinks         Review of Systems   Constitutional: Negative for malaise/fatigue. HENT:        Seasonal allergies controlled on  Meds. Respiratory: Negative for shortness of breath. Cardiovascular: Negative for chest pain and leg swelling.    Gastrointestinal: Negative for abdominal pain and heartburn. Neurological: Negative for dizziness and headaches. Physical Exam  Vitals signs and nursing note reviewed. Constitutional:       General: She is not in acute distress. Appearance: She is well-developed. HENT:      Head: Normocephalic and atraumatic. Cardiovascular:      Rate and Rhythm: Normal rate and regular rhythm. Pulmonary:      Effort: Pulmonary effort is normal.      Breath sounds: Normal breath sounds. No wheezing. Abdominal:      General: Bowel sounds are normal. There is no distension. Palpations: Abdomen is soft. There is no mass. Tenderness: There is no abdominal tenderness. Musculoskeletal:      Right lower leg: No edema. Left lower leg: No edema. Skin:     General: Skin is warm. Findings: No rash. Neurological:      General: No focal deficit present. Psychiatric:         Mood and Affect: Mood normal.         Behavior: Behavior normal.       Visit Vitals  /71 (BP 1 Location: Right arm)   Pulse 66   Temp 97.6 °F (36.4 °C) (Temporal)   Resp 18   Ht 5' 1\" (1.549 m)   Wt 156 lb (70.8 kg)   LMP 06/02/2020   SpO2 99%   BMI 29.48 kg/m²       Assessment & Plan:    ICD-10-CM ICD-9-CM    1. Type 2 diabetes mellitus without complication, without long-term current use of insulin (HCC)  Increase metformin based on BS at home. Check labs and discussed possibly adding weekly ozympic pending lab work. E11.9 250.00 HEMOGLOBIN A1C WITH EAG   2. Hyperlipidemia, unspecified hyperlipidemia type  Well controlled, continue current medication. R27.0 635.8 METABOLIC PANEL, COMPREHENSIVE      LIPID PANEL   3. Seasonal allergic rhinitis due to pollen  Well controlled, continue current medication. J30.1 477.0       follow up 6 months.    Orders Placed This Encounter    METABOLIC PANEL, COMPREHENSIVE    LIPID PANEL    HEMOGLOBIN A1C WITH EAG    metFORMIN ER (GLUCOPHAGE XR) 500 mg tablet    rosuvastatin (CRESTOR) 20 mg tablet Advised her to call back or return to office if symptoms worsen/change/persist.  Discussed expected course/resolution/complications of diagnosis in detail with patient. Medication risks/benefits/costs/interactions/alternatives discussed with patient. She was given an after visit summary which includes diagnoses, current medications, & vitals. She expressed understanding with the diagnosis and plan.

## 2020-07-03 LAB
ALBUMIN SERPL-MCNC: 4.4 G/DL (ref 3.8–4.8)
ALBUMIN/GLOB SERPL: 2.1 {RATIO} (ref 1.2–2.2)
ALP SERPL-CCNC: 38 IU/L (ref 39–117)
ALT SERPL-CCNC: 17 IU/L (ref 0–32)
AST SERPL-CCNC: 15 IU/L (ref 0–40)
BILIRUB SERPL-MCNC: 0.5 MG/DL (ref 0–1.2)
BUN SERPL-MCNC: 16 MG/DL (ref 6–24)
BUN/CREAT SERPL: 26 (ref 9–23)
CALCIUM SERPL-MCNC: 9.2 MG/DL (ref 8.7–10.2)
CHLORIDE SERPL-SCNC: 100 MMOL/L (ref 96–106)
CHOLEST SERPL-MCNC: 232 MG/DL (ref 100–199)
CO2 SERPL-SCNC: 22 MMOL/L (ref 20–29)
CREAT SERPL-MCNC: 0.61 MG/DL (ref 0.57–1)
EST. AVERAGE GLUCOSE BLD GHB EST-MCNC: 171 MG/DL
GLOBULIN SER CALC-MCNC: 2.1 G/DL (ref 1.5–4.5)
GLUCOSE SERPL-MCNC: 157 MG/DL (ref 65–99)
HBA1C MFR BLD: 7.6 % (ref 4.8–5.6)
HDLC SERPL-MCNC: 54 MG/DL
LDLC SERPL CALC-MCNC: 152 MG/DL (ref 0–99)
POTASSIUM SERPL-SCNC: 4.7 MMOL/L (ref 3.5–5.2)
PROT SERPL-MCNC: 6.5 G/DL (ref 6–8.5)
SODIUM SERPL-SCNC: 135 MMOL/L (ref 134–144)
TRIGL SERPL-MCNC: 130 MG/DL (ref 0–149)
VLDLC SERPL CALC-MCNC: 26 MG/DL (ref 5–40)

## 2020-07-10 NOTE — PROGRESS NOTES
mychart message sent. Hemoglobin A1c significantly improved at 7.6. Continue to work on diabetic diet lifestyle. LDL cholesterol not at goal and if taking Crestor 20 mg daily recommend increasing to 40 mg daily and will await patient's MyChart message back confirming that she is agreeable to this change in medication.

## 2020-08-25 ENCOUNTER — TELEPHONE (OUTPATIENT)
Dept: INTERNAL MEDICINE CLINIC | Age: 50
End: 2020-08-25

## 2020-08-25 RX ORDER — ROSUVASTATIN CALCIUM 40 MG/1
40 TABLET, COATED ORAL
Qty: 90 TAB | Refills: 0 | Status: SHIPPED | OUTPATIENT
Start: 2020-08-25 | End: 2021-02-11 | Stop reason: SDUPTHER

## 2020-08-25 NOTE — TELEPHONE ENCOUNTER
----- Message from Doug Seth MD sent at 8/12/2020  6:44 PM EDT -----  Call pt as she read this below [employer] IP[employer] message but has not contacted us regarding the recommendation to increase crestor. ----- Message -----  From: Conner Prakash MD  Sent: 7/10/2020   6:13 PM EDT  To: Doug Seth MD    [employer] IPt message sent. Hemoglobin A1c significantly improved at 7.6. Continue to work on diabetic diet lifestyle. LDL cholesterol not at goal and if taking Crestor 20 mg daily recommend increasing to 40 mg daily and will await patient's Weditt message back confirming that she is agreeable to this change in medication.

## 2020-08-25 NOTE — TELEPHONE ENCOUNTER
Spoke with patient, asking if any significant side effects with increase in dosage. Patient is in agreement if no major side effects.

## 2020-08-25 NOTE — TELEPHONE ENCOUNTER
The medication side effects are the same regardless of the dose. However increasing the dose may increase the risk of side effects. Main side effect is muscle aches. Follow-up appointment and lab work in 6 weeks to review how she is doing on medication and diabetes.

## 2020-09-08 RX ORDER — METFORMIN HYDROCHLORIDE 500 MG/1
TABLET, EXTENDED RELEASE ORAL
Qty: 270 TAB | OUTPATIENT
Start: 2020-09-08

## 2020-12-06 DIAGNOSIS — E11.9 CONTROLLED TYPE 2 DIABETES MELLITUS WITHOUT COMPLICATION, WITHOUT LONG-TERM CURRENT USE OF INSULIN (HCC): Primary | ICD-10-CM

## 2020-12-08 RX ORDER — METFORMIN HYDROCHLORIDE 500 MG/1
TABLET, EXTENDED RELEASE ORAL
Qty: 360 TAB | Refills: 0 | Status: SHIPPED | OUTPATIENT
Start: 2020-12-08 | End: 2021-02-11 | Stop reason: SDUPTHER

## 2021-01-29 ENCOUNTER — TELEPHONE (OUTPATIENT)
Dept: INTERNAL MEDICINE CLINIC | Age: 51
End: 2021-01-29

## 2021-02-11 ENCOUNTER — OFFICE VISIT (OUTPATIENT)
Dept: INTERNAL MEDICINE CLINIC | Age: 51
End: 2021-02-11
Payer: COMMERCIAL

## 2021-02-11 VITALS
HEART RATE: 73 BPM | SYSTOLIC BLOOD PRESSURE: 107 MMHG | HEIGHT: 60 IN | DIASTOLIC BLOOD PRESSURE: 63 MMHG | WEIGHT: 169.4 LBS | TEMPERATURE: 98.1 F | OXYGEN SATURATION: 95 % | RESPIRATION RATE: 16 BRPM | BODY MASS INDEX: 33.26 KG/M2

## 2021-02-11 DIAGNOSIS — E11.9 CONTROLLED TYPE 2 DIABETES MELLITUS WITHOUT COMPLICATION, WITHOUT LONG-TERM CURRENT USE OF INSULIN (HCC): ICD-10-CM

## 2021-02-11 DIAGNOSIS — E78.5 HYPERLIPIDEMIA, UNSPECIFIED HYPERLIPIDEMIA TYPE: ICD-10-CM

## 2021-02-11 DIAGNOSIS — Z00.00 ROUTINE GENERAL MEDICAL EXAMINATION AT A HEALTH CARE FACILITY: Primary | ICD-10-CM

## 2021-02-11 PROCEDURE — 99396 PREV VISIT EST AGE 40-64: CPT | Performed by: INTERNAL MEDICINE

## 2021-02-11 RX ORDER — ROSUVASTATIN CALCIUM 40 MG/1
40 TABLET, COATED ORAL
Qty: 90 TAB | Refills: 1 | Status: SHIPPED | OUTPATIENT
Start: 2021-02-11 | End: 2021-08-18

## 2021-02-11 RX ORDER — METFORMIN HYDROCHLORIDE 500 MG/1
TABLET, EXTENDED RELEASE ORAL
Qty: 360 TAB | Refills: 1 | Status: SHIPPED | OUTPATIENT
Start: 2021-02-11 | End: 2021-03-09

## 2021-02-11 NOTE — LETTER
2/24/2021 9:04 AM 
 
Ms. Walker Brewer [id number] Ms. Hernandez, Below you will find the results of your most recent labs. Office Visit on 02/11/2021 Component Date Value Ref Range Status  Microalbumin,urine random 02/11/2021 10.50  MG/DL Final  
 No reference range has been established.  Creatinine, urine 02/11/2021 77.70  mg/dL Final  
 No reference range has been established.  Microalbumin/Creat ratio (mg/g cre* 02/11/2021 135* 0 - 30 mg/g Final  
 TSH 02/11/2021 2.16  0.36 - 3.74 uIU/mL Final  
 Comment:  
Due to TSH heterogeneity, both structurally and degree of glycosylation, 
monoclonal antibodies used in the TSH assay may not accurately quantitate TSH. Therefore, this result should be correlated with clinical findings as well as 
with other assessments of thyroid function, e.g., free T4, free T3. 
  
 Hemoglobin A1c 02/11/2021 9.4* 4.0 - 5.6 % Final  
 Comment: NEW METHOD PLEASE NOTE NEW REFERENCE RANGE 
(NOTE) HbA1C Interpretive Ranges <5.7              Normal 
5.7 - 6.4         Consider Prediabetes >6.5              Consider Diabetes  Est. average glucose 02/11/2021 223  mg/dL Final  
 Sodium 02/11/2021 131* 136 - 145 mmol/L Final  
 Potassium 02/11/2021 4.0  3.5 - 5.1 mmol/L Final  
 Chloride 02/11/2021 98  97 - 108 mmol/L Final  
 CO2 02/11/2021 25  21 - 32 mmol/L Final  
 Anion gap 02/11/2021 8  5 - 15 mmol/L Final  
 Glucose 02/11/2021 179* 65 - 100 mg/dL Final  
 BUN 02/11/2021 13  6 - 20 MG/DL Final  
 Creatinine 02/11/2021 0.68  0.55 - 1.02 MG/DL Final  
 BUN/Creatinine ratio 02/11/2021 19  12 - 20   Final  
 GFR est AA 02/11/2021 >60  >60 ml/min/1.73m2 Final  
 GFR est non-AA 02/11/2021 >60  >60 ml/min/1.73m2 Final  
 Comment: Estimated GFR is calculated using the IDMS-traceable Modification of Diet in 
Renal Disease (MDRD) Study equation, reported for both  [facility]) and non- Americans (GFRNA), and normalized to 1.73m2 body 
surface area. The physician must decide which value applies to the patient.  Calcium 02/11/2021 9.3  8.5 - 10.1 MG/DL Final  
 Bilirubin, total 02/11/2021 0.5  0.2 - 1.0 MG/DL Final  
 ALT (SGPT) 02/11/2021 51  12 - 78 U/L Final  
 AST (SGOT) 02/11/2021 23  15 - 37 U/L Final  
 Alk. phosphatase 02/11/2021 46  45 - 117 U/L Final  
 Protein, total 02/11/2021 7.9  6.4 - 8.2 g/dL Final  
 Albumin 02/11/2021 4.5  3.5 - 5.0 g/dL Final  
 Globulin 02/11/2021 3.4  2.0 - 4.0 g/dL Final  
 A-G Ratio 02/11/2021 1.3  1.1 - 2.2   Final  
 LIPID PROFILE 02/11/2021        Final  
 Cholesterol, total 02/11/2021 298* <200 MG/DL Final  
 Triglyceride 02/11/2021 318* <150 MG/DL Final  
 Comment: Based on NCEP-ATP III:  Triglycerides <150 mg/dL  is considered normal, 150-199 
mg/dL  borderline high,  200-499 mg/dL high and  greater than or equal to 500 
mg/dL very high.  HDL Cholesterol 02/11/2021 54  MG/DL Final  
 Comment: Based on NCEP ATP III, HDL Cholesterol <40 mg/dL is considered low and >60 
mg/dL is elevated.  LDL, calculated 02/11/2021 180.4* 0 - 100 MG/DL Final  
 Comment: Based on the NCEP-ATP: LDL-C concentrations are considered  optimal <100 mg/dL, 
near optimal/above Normal 100-129 mg/dL Borderline High: 130-159, High: 160-189 
mg/dL Very High: Greater than or equal to 190 mg/dL  VLDL, calculated 02/11/2021 63.6  MG/DL Final  
 CHOL/HDL Ratio 02/11/2021 5.5* 0.0 - 5.0   Final  
 WBC 02/11/2021 5.9  3.6 - 11.0 K/uL Final  
 RBC 02/11/2021 4.95  3.80 - 5.20 M/uL Final  
 HGB 02/11/2021 13.8  11.5 - 16.0 g/dL Final  
 HCT 02/11/2021 41.2  35.0 - 47.0 % Final  
 MCV 02/11/2021 83.2  80.0 - 99.0 FL Final  
 MCH 02/11/2021 27.9  26.0 - 34.0 PG Final  
 MCHC 02/11/2021 33.5  30.0 - 36.5 g/dL Final  
 RDW 02/11/2021 12.0  11.5 - 14.5 % Final  
 PLATELET 46/06/1116 308  150 - 400 K/uL Final  
 • MPV 02/11/2021 9.9  8.9 - 12.9 FL Final  
• NRBC 02/11/2021 0.0  0  WBC Final  
• ABSOLUTE NRBC 02/11/2021 0.00  0.00 - 0.01 K/uL Final  
• NEUTROPHILS 02/11/2021 47  32 - 75 % Final  
• LYMPHOCYTES 02/11/2021 41  12 - 49 % Final  
• MONOCYTES 02/11/2021 6  5 - 13 % Final  
• EOSINOPHILS 02/11/2021 5  0 - 7 % Final  
• BASOPHILS 02/11/2021 1  0 - 1 % Final  
• IMMATURE GRANULOCYTES 02/11/2021 0  0.0 - 0.5 % Final  
• ABS. NEUTROPHILS 02/11/2021 2.8  1.8 - 8.0 K/UL Final  
• ABS. LYMPHOCYTES 02/11/2021 2.4  0.8 - 3.5 K/UL Final  
• ABS. MONOCYTES 02/11/2021 0.4  0.0 - 1.0 K/UL Final  
• ABS. EOSINOPHILS 02/11/2021 0.3  0.0 - 0.4 K/UL Final  
• ABS. BASOPHILS 02/11/2021 0.1  0.0 - 0.1 K/UL Final  
• ABS. IMM. GRANS. 02/11/2021 0.0  0.00 - 0.04 K/UL Final  
• DF 02/11/2021 AUTOMATED    Final  
 
Recommendation:Your lab work indicates that your diabetes is not controlled.  Your hemoglobin A1c has now increased to 9.4.  We are picking up larger amounts of protein in your urine indicating there is some damage occurring to your kidneys which is likely from the poorly controlled diabetes.  
Your cholesterol is also very high.  Are you taking your Crestor every day as it almost looks like maybe you are missing your medication?  
I know we had discussed at your recent visit that you did not want to add additional diabetes medication if your lab work was not at goal but wanted to work on lifestyle and improving your diabetic diet for the next 3 months.  Please make sure you are taking your medications daily, follow a strict diabetic diet, and I would like to recheck your lab work in 3 months and please schedule an appointment to see me in 3 months.  If your blood work does not improve in the next 3 months we should definitely consider adjusting medication.  
 
If you have any questions or concerns, please call the office at [phone]. 
 
 
Sincerely, 
 
 
Claudia Covarrubias MD

## 2021-02-11 NOTE — PROGRESS NOTES
Jodi Reich is a 54 y.o. female who was seen in clinic today (2/11/2021) for a full physical.       Assessment & Plan:   Diagnoses and all orders for this visit:    1. Routine general medical examination at a health care facility  Health maintenance reviewed and updated with patient today at visit. -     CBC WITH AUTOMATED DIFF; Future  -     LIPID PANEL; Future  -     METABOLIC PANEL, COMPREHENSIVE; Future    2. Controlled type 2 diabetes mellitus without complication, without long-term current use of insulin ([address]  Check lab work. Encourage to avoid snacking and follow better diabetic diet. If not at goal she wants to work on lifestyle another 3 months   -     metFORMIN ER (GLUCOPHAGE XR) 500 mg tablet; TAKE 4 TABLETS BY MOUTH DAILY WITH DINNER  -     LIPID PANEL; Future  -     METABOLIC PANEL, COMPREHENSIVE; Future  -     HEMOGLOBIN A1C WITH EAG; Future  -     TSH 3RD GENERATION; Future  -     MICROALBUMIN, UR, RAND W/ MICROALB/CREAT RATIO; Future  -     HAL 3D NICOLE W MAMMO BI SCREENING INCL CAD; Future    3. Hyperlipidemia, unspecified hyperlipidemia type  Continue current plan and check lab work. Other orders  -     rosuvastatin (CRESTOR) 40 mg tablet; Take 1 Tab by mouth nightly. follow up 6 months pending lab work   Subjective: [facility] is here today for a full physical.      Health Maintenance  Immunizations:   Influenza: up to date   Tetanus: up to date      Cancer screening:   Cervical: reviewed guidelines, UTD, done by OBGYN, records requested  Breast: reviewed guidelines, UTD, done by OBGYN, records requested. BS:not checking recently. 2 months ago -190 range. Destiny Mealing has increased 13 pounds over past 6 months. not following good diabetic diet and tolerating metformin with occasional diarrhea. Feels she is snacking more at home.   Exercise has started walking.  Not following low fat/chol diet.   Tolerating statin medications with no side effects  The following sections were reviewed & updated as appropriate: Problem List, Allergies, PMH, PSH, FH, and SH. Prior to Admission medications    Medication Sig Start Date End Date Taking? Authorizing Provider   metFORMIN ER (GLUCOPHAGE XR) 500 mg tablet TAKE 4 TABLETS BY MOUTH DAILY WITH DINNER 12/8/20  Yes Elyse Bazzi MD   OneTouch Ultra Blue Test Strip strip [facility] BLOOD SUGAR TWICE DAILY 12/8/20  Yes Elyse Bazzi MD   rosuvastatin (CRESTOR) 40 mg tablet Take 1 Tab by mouth nightly. 8/25/20  Yes Elyse Bazzi MD   lancets [facility] [place] [facility] CHECK BLOOD SUGAR AS DIRECTED 4/6/20  Yes Elyse Bazzi MD   acetaminophen (TYLENOL) 325 mg tablet Take  by mouth every four (4) hours as needed for Pain. Yes Provider, Historical   glucose blood VI test strips (BLOOD GLUCOSE TEST) strip Pharmacist to choose preferred meter and strips. Dx: 12/20/19  Yes Elyse Bazzi MD   fluticasone (FLONASE) 50 mcg/actuation nasal spray 2 Sprays by Both Nostrils route daily. 2/6/19  Yes Maddie Wiseman MD   coenzyme q10-vitamin e (COQ10 ) 100-100 mg-unit cap Take 2 Caps by mouth daily. Yes Provider, Historical   multivitamin (ONE A DAY) tablet Take 1 Tab by mouth daily. Yes Provider, Historical   B.infantis-B.ani-B.long-B.bifi (PROBIOTIC 4X) 10-15 mg TbEC Take 3 Caps by mouth daily. Yes Provider, Historical   naproxen sodium (ALEVE) 220 mg cap Take 1 Cap by mouth daily as needed. Yes Provider, Historical   Cetirizine (ZYRTEC) 10 mg cap Take 1 Cap by mouth daily as needed. Yes Provider, Historical          Review of Systems   Constitutional: Negative for fever and malaise/fatigue. HENT: Negative for congestion and sore throat. Eyes: Negative for blurred vision. Respiratory: Negative for cough and shortness of breath. Cardiovascular: Negative for chest pain, palpitations and leg swelling. Gastrointestinal: Negative for abdominal pain and heartburn. Genitourinary: Negative for dysuria, frequency and urgency. Musculoskeletal: Negative for back pain and myalgias. Neurological: Negative for dizziness, sensory change, focal weakness and headaches. Psychiatric/Behavioral: Negative for depression. The patient is not nervous/anxious. Objective:   Physical Exam  Vitals signs and nursing note reviewed. Constitutional:       General: She is not in acute distress. Appearance: She is well-developed. HENT:      Head: Normocephalic and atraumatic. Right Ear: Tympanic membrane and ear canal normal.      Left Ear: Tympanic membrane and ear canal normal.      Nose: Nose normal.   Eyes:      Conjunctiva/sclera: Conjunctivae normal.      Pupils: Pupils are equal, round, and reactive to light. Neck:      Musculoskeletal: Normal range of motion. Thyroid: No thyromegaly. Cardiovascular:      Rate and Rhythm: Normal rate and regular rhythm. Heart sounds: Normal heart sounds. No murmur. Pulmonary:      Effort: Pulmonary effort is normal.      Breath sounds: Normal breath sounds. Chest:      Comments: Breast exam: breasts appear normal, no suspicious masses, no skin or nipple changes or axillary nodes. Abdominal:      General: Bowel sounds are normal.      Palpations: Abdomen is soft. There is no mass. Tenderness: There is no abdominal tenderness. Musculoskeletal:      Right lower leg: No edema. Left lower leg: No edema. Lymphadenopathy:      Cervical: No cervical adenopathy. Skin:     Findings: No rash. Neurological:      General: No focal deficit present. Cranial Nerves: No cranial nerve deficit. Sensory: No sensory deficit.    Psychiatric:         Mood and Affect: Mood normal.            Visit Vitals  /63 (BP 1 Location: Right arm, BP Patient Position: Sitting, BP Cuff Size: Large adult)   Pulse 73   Temp 98.1 °F (36.7 °C) (Oral)   Resp 16   Ht 4' 11.96\" (1.523 m)   Wt 169 lb 6.4 oz (76.8 kg)   LMP 01/28/2021   SpO2 95%   BMI 33.13 kg/m²          Bob Covington MD

## 2021-02-12 LAB
ALBUMIN SERPL-MCNC: 4.5 G/DL (ref 3.5–5)
ALBUMIN/GLOB SERPL: 1.3 {RATIO} (ref 1.1–2.2)
ALP SERPL-CCNC: 46 U/L (ref 45–117)
ALT SERPL-CCNC: 51 U/L (ref 12–78)
ANION GAP SERPL CALC-SCNC: 8 MMOL/L (ref 5–15)
AST SERPL-CCNC: 23 U/L (ref 15–37)
BASOPHILS # BLD: 0.1 K/UL (ref 0–0.1)
BASOPHILS NFR BLD: 1 % (ref 0–1)
BILIRUB SERPL-MCNC: 0.5 MG/DL (ref 0.2–1)
BUN SERPL-MCNC: 13 MG/DL (ref 6–20)
BUN/CREAT SERPL: 19 (ref 12–20)
CALCIUM SERPL-MCNC: 9.3 MG/DL (ref 8.5–10.1)
CHLORIDE SERPL-SCNC: 98 MMOL/L (ref 97–108)
CHOLEST SERPL-MCNC: 298 MG/DL
CO2 SERPL-SCNC: 25 MMOL/L (ref 21–32)
CREAT SERPL-MCNC: 0.68 MG/DL (ref 0.55–1.02)
CREAT UR-MCNC: 77.7 MG/DL
DIFFERENTIAL METHOD BLD: NORMAL
EOSINOPHIL # BLD: 0.3 K/UL (ref 0–0.4)
EOSINOPHIL NFR BLD: 5 % (ref 0–7)
ERYTHROCYTE [DISTWIDTH] IN BLOOD BY AUTOMATED COUNT: 12 % (ref 11.5–14.5)
EST. AVERAGE GLUCOSE BLD GHB EST-MCNC: 223 MG/DL
GLOBULIN SER CALC-MCNC: 3.4 G/DL (ref 2–4)
GLUCOSE SERPL-MCNC: 179 MG/DL (ref 65–100)
HBA1C MFR BLD: 9.4 % (ref 4–5.6)
HCT VFR BLD AUTO: 41.2 % (ref 35–47)
HDLC SERPL-MCNC: 54 MG/DL
HDLC SERPL: 5.5 {RATIO} (ref 0–5)
HGB BLD-MCNC: 13.8 G/DL (ref 11.5–16)
IMM GRANULOCYTES # BLD AUTO: 0 K/UL (ref 0–0.04)
IMM GRANULOCYTES NFR BLD AUTO: 0 % (ref 0–0.5)
LDLC SERPL CALC-MCNC: 180.4 MG/DL (ref 0–100)
LIPID PROFILE,FLP: ABNORMAL
LYMPHOCYTES # BLD: 2.4 K/UL (ref 0.8–3.5)
LYMPHOCYTES NFR BLD: 41 % (ref 12–49)
MCH RBC QN AUTO: 27.9 PG (ref 26–34)
MCHC RBC AUTO-ENTMCNC: 33.5 G/DL (ref 30–36.5)
MCV RBC AUTO: 83.2 FL (ref 80–99)
MICROALBUMIN UR-MCNC: 10.5 MG/DL
MICROALBUMIN/CREAT UR-RTO: 135 MG/G (ref 0–30)
MONOCYTES # BLD: 0.4 K/UL (ref 0–1)
MONOCYTES NFR BLD: 6 % (ref 5–13)
NEUTS SEG # BLD: 2.8 K/UL (ref 1.8–8)
NEUTS SEG NFR BLD: 47 % (ref 32–75)
NRBC # BLD: 0 K/UL (ref 0–0.01)
NRBC BLD-RTO: 0 PER 100 WBC
PLATELET # BLD AUTO: 353 K/UL (ref 150–400)
PMV BLD AUTO: 9.9 FL (ref 8.9–12.9)
POTASSIUM SERPL-SCNC: 4 MMOL/L (ref 3.5–5.1)
PROT SERPL-MCNC: 7.9 G/DL (ref 6.4–8.2)
RBC # BLD AUTO: 4.95 M/UL (ref 3.8–5.2)
SODIUM SERPL-SCNC: 131 MMOL/L (ref 136–145)
TRIGL SERPL-MCNC: 318 MG/DL (ref ?–150)
TSH SERPL DL<=0.05 MIU/L-ACNC: 2.16 UIU/ML (ref 0.36–3.74)
VLDLC SERPL CALC-MCNC: 63.6 MG/DL
WBC # BLD AUTO: 5.9 K/UL (ref 3.6–11)

## 2021-02-18 NOTE — PROGRESS NOTES
Foradian message sent. Diabetes and cholesterol poorly controlled. Patient had indicated at recent visit that she wanted to work on lifestyle and remain on same medications for the next 3 months to see if she can improve her numbers. Follow-up 3 months recheck lab work 3 months.

## 2021-03-08 DIAGNOSIS — E11.9 CONTROLLED TYPE 2 DIABETES MELLITUS WITHOUT COMPLICATION, WITHOUT LONG-TERM CURRENT USE OF INSULIN (HCC): ICD-10-CM

## 2021-03-09 RX ORDER — METFORMIN HYDROCHLORIDE 500 MG/1
TABLET, EXTENDED RELEASE ORAL
Qty: 360 TAB | Refills: 1 | Status: SHIPPED | OUTPATIENT
Start: 2021-03-09 | End: 2021-09-21 | Stop reason: SDUPTHER

## 2021-08-18 RX ORDER — ROSUVASTATIN CALCIUM 40 MG/1
TABLET, COATED ORAL
Qty: 30 TABLET | Refills: 0 | Status: SHIPPED | OUTPATIENT
Start: 2021-08-18 | End: 2021-09-21 | Stop reason: SDUPTHER

## 2021-09-21 ENCOUNTER — OFFICE VISIT (OUTPATIENT)
Dept: INTERNAL MEDICINE CLINIC | Age: 51
End: 2021-09-21
Payer: COMMERCIAL

## 2021-09-21 VITALS
WEIGHT: 160.4 LBS | HEIGHT: 60 IN | DIASTOLIC BLOOD PRESSURE: 78 MMHG | RESPIRATION RATE: 16 BRPM | BODY MASS INDEX: 31.49 KG/M2 | SYSTOLIC BLOOD PRESSURE: 120 MMHG | HEART RATE: 68 BPM | OXYGEN SATURATION: 99 % | TEMPERATURE: 97.1 F

## 2021-09-21 DIAGNOSIS — Z11.59 NEED FOR HEPATITIS C SCREENING TEST: ICD-10-CM

## 2021-09-21 DIAGNOSIS — Z12.11 SCREEN FOR COLON CANCER: ICD-10-CM

## 2021-09-21 DIAGNOSIS — E11.9 TYPE 2 DIABETES MELLITUS WITHOUT COMPLICATION, WITH LONG-TERM CURRENT USE OF INSULIN (HCC): Primary | ICD-10-CM

## 2021-09-21 DIAGNOSIS — Z79.4 TYPE 2 DIABETES MELLITUS WITHOUT COMPLICATION, WITH LONG-TERM CURRENT USE OF INSULIN (HCC): Primary | ICD-10-CM

## 2021-09-21 DIAGNOSIS — E78.5 HYPERLIPIDEMIA, UNSPECIFIED HYPERLIPIDEMIA TYPE: ICD-10-CM

## 2021-09-21 LAB
ALBUMIN SERPL-MCNC: 4.2 G/DL (ref 3.5–5)
ALBUMIN/GLOB SERPL: 1.4 {RATIO} (ref 1.1–2.2)
ALP SERPL-CCNC: 57 U/L (ref 45–117)
ALT SERPL-CCNC: 36 U/L (ref 12–78)
ANION GAP SERPL CALC-SCNC: 8 MMOL/L (ref 5–15)
AST SERPL-CCNC: 15 U/L (ref 15–37)
BILIRUB SERPL-MCNC: 0.8 MG/DL (ref 0.2–1)
BUN SERPL-MCNC: 19 MG/DL (ref 6–20)
BUN/CREAT SERPL: 29 (ref 12–20)
CALCIUM SERPL-MCNC: 8.9 MG/DL (ref 8.5–10.1)
CHLORIDE SERPL-SCNC: 100 MMOL/L (ref 97–108)
CHOLEST SERPL-MCNC: 104 MG/DL
CO2 SERPL-SCNC: 24 MMOL/L (ref 21–32)
CREAT SERPL-MCNC: 0.66 MG/DL (ref 0.55–1.02)
EST. AVERAGE GLUCOSE BLD GHB EST-MCNC: 315 MG/DL
GLOBULIN SER CALC-MCNC: 2.9 G/DL (ref 2–4)
GLUCOSE SERPL-MCNC: 270 MG/DL (ref 65–100)
HBA1C MFR BLD: 12.6 % (ref 4–5.6)
HCV AB SERPL QL IA: NONREACTIVE
HDLC SERPL-MCNC: 46 MG/DL
HDLC SERPL: 2.3 {RATIO} (ref 0–5)
LDLC SERPL CALC-MCNC: 27 MG/DL (ref 0–100)
POTASSIUM SERPL-SCNC: 4.3 MMOL/L (ref 3.5–5.1)
PROT SERPL-MCNC: 7.1 G/DL (ref 6.4–8.2)
SODIUM SERPL-SCNC: 132 MMOL/L (ref 136–145)
TRIGL SERPL-MCNC: 155 MG/DL (ref ?–150)
VLDLC SERPL CALC-MCNC: 31 MG/DL

## 2021-09-21 PROCEDURE — 99214 OFFICE O/P EST MOD 30 MIN: CPT | Performed by: INTERNAL MEDICINE

## 2021-09-21 RX ORDER — METFORMIN HYDROCHLORIDE 500 MG/1
TABLET, EXTENDED RELEASE ORAL
Qty: 360 TABLET | Refills: 1 | Status: SHIPPED | OUTPATIENT
Start: 2021-09-21 | End: 2022-03-21

## 2021-09-21 RX ORDER — ROSUVASTATIN CALCIUM 40 MG/1
40 TABLET, COATED ORAL
Qty: 90 TABLET | Refills: 1 | Status: SHIPPED | OUTPATIENT
Start: 2021-09-21 | End: 2022-03-24 | Stop reason: SDUPTHER

## 2021-09-21 NOTE — PROGRESS NOTES
Nuha Hung is a 54 y.o. female who was seen today for a follow-up visit. Assessment & Plan:       ICD-10-CM ICD-9-CM    1. Type 2 diabetes mellitus without complication, with long-term current use of insulin ([address]   Continue with Metformin diabetic diet and weight loss strategies. Check lab work   Continue assessing your diabetes goal.  Blood sugars at home. E11.9 250.00 LIPID PANEL    L73.4 Y34.63 METABOLIC PANEL, COMPREHENSIVE      HEMOGLOBIN A1C WITH EAG      metFORMIN ER (GLUCOPHAGE XR) 500 mg tablet   2. Hyperlipidemia, unspecified hyperlipidemia type   Continue current medication which she is taking daily now and check lab work. Follow a low-fat low-cholesterol diet. E78.5 272.4    3. Need for hepatitis C screening test  Z11.59 V73.89 HEPATITIS C AB   4. Screen for colon cancer  Z12.11 V76.51 REFERRAL TO GASTROENTEROLOGY   0.5. Obesity: Continue with her current weight loss regimen plan. Follow-up 6 months. Subjective: Nuha Hung was seen for Diabetes and hypercholesterolemia. She has been able to lose 9 pounds doing intermittent fasting. Trying to stay away from sugar/carbs. Exercise: walking and jump rope. Taking medication with no side effects. She has not been checking blood sugars recently. Review of Systems   Respiratory: Negative for shortness of breath. Cardiovascular: Negative for chest pain and leg swelling. Gastrointestinal: Negative for abdominal pain. - per HPI    Physical Exam  Vitals and nursing note reviewed. Constitutional:       General: She is not in acute distress. Appearance: She is well-developed. HENT:      Head: Normocephalic and atraumatic. Cardiovascular:      Rate and Rhythm: Normal rate and regular rhythm. Pulmonary:      Effort: Pulmonary effort is normal.      Breath sounds: Normal breath sounds. Abdominal:      General: Bowel sounds are normal.      Palpations: Abdomen is soft. Tenderness: There is no abdominal tenderness. Monofilament intact bilaterally. Pulses 2+ bilaterally. No skin lesions or open wounds. Visit Vitals  /78 (BP 1 Location: Right arm, BP Patient Position: Sitting, BP Cuff Size: Large adult)   Pulse 68   Temp 97.1 °F (36.2 °C) (Temporal)   Resp 16   Ht 4' 11.96\" (1.523 m)   Wt 160 lb 6.4 oz (72.8 kg)   LMP 08/24/2021   SpO2 99%   BMI 31.37 kg/m²          Aspects of this note may have been generated using voice recognition software. Despite editing, there may be some syntax errors   I have discussed the diagnosis with the patient and the intended plan as seen in the above orders. The patient has received an after-visit summary and questions were answered concerning future plans. I have discussed any recommended medication side effects and warnings with the patient as well.   She has expressed understanding of the diagnosis and plan    Balaji Su MD

## 2021-09-22 NOTE — PROGRESS NOTES
Please notify patient that her hemoglobin A1c is extremely high. Her average blood sugar is 315. I would like to start her on Onglyza which is a weekly injectable medication. We can schedule her an appointment to see Aissatou our Pharm. D. and she can teach her how to give the injectable as well as review her medications and diabetic  diet. Please let her know her cholesterol overall looks good. Her sodium level is a little low. But better than last check. I will plan to repeat her lab work in 3 months. I would like her to make an appointment to see me 1 month after starting the injectable medication and bring blood sugars to that visit.

## 2021-09-23 ENCOUNTER — TELEPHONE (OUTPATIENT)
Dept: INTERNAL MEDICINE CLINIC | Age: 51
End: 2021-09-23

## 2021-09-23 NOTE — TELEPHONE ENCOUNTER
Pharmacy Progress Note - Telephone Encounter    S/O: Ms. Ang Hernandez 54 y.o. female, referred by Dr. Roman Lerner MD, was contacted via an outbound telephone call to discuss scheduling DM management visit today. Verified patients identifiers (name & ) per HIPAA policy. - Pt was informed of lab results. Discussed lipid panel, metabolic panel, HCV, and Q9C.    - Pt amenable to scheduling visit with PharmD.      - Reports stopping BG checks at home 3 months ago - needs to replace battery. A/P:  - Scheduled 10/13/21 at 1:30PM  - Encouraged pt to bring all meds and BG rdgs to visit  - Patient endorses understanding to the provided information. All questions answered at this time. There are no discontinued medications. No orders of the defined types were placed in this encounter. Yissel MajorD, [facility]  Clinical Pharmacist Specialist      For Pharmacy Admin Tracking Only     CPA in place:  Yes   Recommendation Provided To: Patient/Caregiver: 1 via Telephone   Intervention Detail: Scheduled Appointment   Gap Closed?: No   Intervention Accepted By: Patient/Caregiver: 1   Time Spent (min): 5

## 2021-09-24 DIAGNOSIS — Z79.4 TYPE 2 DIABETES MELLITUS WITHOUT COMPLICATION, WITH LONG-TERM CURRENT USE OF INSULIN (HCC): ICD-10-CM

## 2021-09-24 DIAGNOSIS — E11.9 TYPE 2 DIABETES MELLITUS WITHOUT COMPLICATION, WITH LONG-TERM CURRENT USE OF INSULIN (HCC): ICD-10-CM

## 2021-09-30 RX ORDER — METFORMIN HYDROCHLORIDE 500 MG/1
TABLET, EXTENDED RELEASE ORAL
Qty: 360 TABLET | Refills: 1 | OUTPATIENT
Start: 2021-09-30

## 2021-10-18 ENCOUNTER — OFFICE VISIT (OUTPATIENT)
Dept: URGENT CARE | Age: 51
End: 2021-10-18
Payer: COMMERCIAL

## 2021-10-18 VITALS — HEART RATE: 74 BPM | TEMPERATURE: 98.4 F | OXYGEN SATURATION: 95 % | RESPIRATION RATE: 14 BRPM

## 2021-10-18 DIAGNOSIS — J06.9 VIRAL UPPER RESPIRATORY TRACT INFECTION: ICD-10-CM

## 2021-10-18 DIAGNOSIS — Z20.822 SUSPECTED COVID-19 VIRUS INFECTION: Primary | ICD-10-CM

## 2021-10-18 LAB — SARS-COV-2 POC: NEGATIVE

## 2021-10-18 PROCEDURE — 99212 OFFICE O/P EST SF 10 MIN: CPT | Performed by: FAMILY MEDICINE

## 2021-10-18 PROCEDURE — 87426 SARSCOV CORONAVIRUS AG IA: CPT | Performed by: FAMILY MEDICINE

## 2021-10-18 NOTE — PROGRESS NOTES
This patient was seen at [address] [place] [address] Urgent Care while in their vehicle due to COVID-19 pandemic with PPE and focused examination in order to decrease community viral transmission. The patient/guardian gave verbal consent to treat. No direct covid exposure  She is been vaccinated for covid     The history is provided by the patient. Cold Symptoms  The history is provided by the patient. This is a new problem. The current episode started 2 days ago. The problem occurs every few minutes. The problem has not changed since onset. The cough is non-productive. There has been no fever. Associated symptoms include headaches (mild ), sore throat and myalgias. Pertinent negatives include no chest pain, no chills, no shortness of breath and no wheezing. She has tried nothing for the symptoms. Risk factors: as above. She is not a smoker. Her past medical history does not include pneumonia or asthma.         Past Medical History:   Diagnosis Date    Diabetes mellitus     Hyperlipidemia 2015        Past Surgical History:   Procedure Laterality Date    HX GYN                Family History   Problem Relation Age of Onset    Cancer Mother         lung    Hypertension Father     Cancer Maternal Grandfather         lung        Social History     Socioeconomic History    Marital status:      Spouse name: Not on file    Number of children: Not on file    Years of education: Not on file    Highest education level: Not on file   Occupational History    Occupation:      Employer: [employer]   Tobacco Use    Smoking status: Never Smoker    Smokeless tobacco: Never Used   Vaping Use    Vaping Use: Never used   Substance and Sexual Activity    Alcohol use: No     Alcohol/week: 0.0 standard drinks    Drug use: No    Sexual activity: Yes     Partners: Male     Birth control/protection: None     Comment: single   Other Topics Concern    Not on file   Social History Narrative  Not on file     Social Determinants of Health     Financial Resource Strain:     Difficulty of Paying Living Expenses:    Food Insecurity:     Worried About Running Out of Food in the Last Year:     [address] in the Last Year:    Transportation Needs:     Lack of Transportation (Medical):  Lack of Transportation (Non-Medical):    Physical Activity:     Days of Exercise per Week:     Minutes of Exercise per Session:    Stress:     Feeling of Stress :    Social Connections:     Frequency of Communication with Friends and Family:     Frequency of Social Gatherings with Friends and Family:     Attends Yarsani Services:     Active Member of Clubs or Organizations:     Attends Club or Organization Meetings:     Marital Status:    Intimate Partner Violence:     Fear of Current or Ex-Partner:     Emotionally Abused:     Physically Abused:     Sexually Abused: ALLERGIES: Patient has no known allergies. Review of Systems   Constitutional: Positive for fatigue. Negative for chills. HENT: Positive for congestion and sore throat. Respiratory: Positive for cough. Negative for shortness of breath and wheezing. Cardiovascular: Negative for chest pain. Musculoskeletal: Positive for myalgias. Neurological: Positive for headaches (mild ). All other systems reviewed and are negative. Vitals:    10/18/21 1057   Pulse: 74   Resp: 14   Temp: 98.4 °F (36.9 °C)   SpO2: 95%       Physical Exam  Vitals and nursing note reviewed. Constitutional:       General: She is not in acute distress. Appearance: She is not ill-appearing. Pulmonary:      Effort: Pulmonary effort is normal. No respiratory distress. Breath sounds: No wheezing. MDM    Procedures        ICD-10-CM ICD-9-CM    1. Suspected COVID-19 virus infection  Z20.822 V01.79 AMB POC SARS-COV-2   2.  Viral upper respiratory tract infection  J06.9 465.9      No orders of the defined types were placed in this encounter. Results for orders placed or performed in visit on 10/18/21   AMB POC SARS-COV-2   Result Value Ref Range    SARS-COV-2 POC Negative Negative       symptomatic Rx   The patients condition was discussed with the patient and they understand. The patient is to follow up with primary care doctor. If signs and symptoms become worse the pt is to go to the ER. The patient is to take medications as prescribed.

## 2021-11-02 ENCOUNTER — OFFICE VISIT (OUTPATIENT)
Dept: INTERNAL MEDICINE CLINIC | Age: 51
End: 2021-11-02
Payer: COMMERCIAL

## 2021-11-02 ENCOUNTER — NURSE TRIAGE (OUTPATIENT)
Dept: OTHER | Facility: CLINIC | Age: 51
End: 2021-11-02

## 2021-11-02 VITALS
OXYGEN SATURATION: 96 % | HEIGHT: 59 IN | BODY MASS INDEX: 32.25 KG/M2 | WEIGHT: 160 LBS | SYSTOLIC BLOOD PRESSURE: 134 MMHG | HEART RATE: 87 BPM | DIASTOLIC BLOOD PRESSURE: 79 MMHG | RESPIRATION RATE: 16 BRPM | TEMPERATURE: 97.5 F

## 2021-11-02 DIAGNOSIS — J01.00 ACUTE NON-RECURRENT MAXILLARY SINUSITIS: Primary | ICD-10-CM

## 2021-11-02 PROCEDURE — 99213 OFFICE O/P EST LOW 20 MIN: CPT | Performed by: INTERNAL MEDICINE

## 2021-11-02 RX ORDER — AZITHROMYCIN 250 MG/1
TABLET, FILM COATED ORAL
Qty: 6 TABLET | Refills: 0 | OUTPATIENT
Start: 2021-11-02 | End: 2021-11-04

## 2021-11-02 NOTE — TELEPHONE ENCOUNTER
Received call from [address]  at [facility] with Red Flag Complaint. Brief description of triage: headache    Triage indicates for patient to be seen today. UCC suggested in no PCP appointments. Care advice provided, patient verbalizes understanding; denies any other questions or concerns; instructed to call back for any new or worsening symptoms. Writer provided warm transfer to [facility]   at [facility] for appointment scheduling. Attention Provider: Thank you for allowing me to participate in the care of your patient. The patient was connected to triage in response to information provided to the [facility]. Please do not respond through this encounter as the response is not directed to a shared pool. Reason for Disposition   Patient wants to be seen    Answer Assessment - Initial Assessment Questions  1. LOCATION: \"Where does it hurt? \"       Right side front of the head. Now left side of the head hurting    2. ONSET: \"When did the headache start? \" (Minutes, hours or days)       Friday    3. PATTERN: \"Does the pain come and go, or has it been constant since it started? \"      Constant unless she takes Tylenol  Cannot sleep at night unless she takes Tylenol has been taking 2-3x per day    4. SEVERITY: \"How bad is the pain? \" and \"What does it keep you from doing? \"  (e.g., Scale 1-10; mild, moderate, or severe)    - MILD (1-3): doesn't interfere with normal activities     - MODERATE (4-7): interferes with normal activities or awakens from sleep     - SEVERE (8-10): excruciating pain, unable to do any normal activities        Currently is a 1-2/10 with Tylenol on board    5. RECURRENT SYMPTOM: \"Have you ever had headaches before? \" If so, ask: \"When was the last time? \" and \"What happened that time? \"       She has a history of headaches    6. CAUSE: \"What do you think is causing the headache? \"      Migraine she thinks    7. MIGRAINE: \"Have you been diagnosed with migraine headaches? \" If so, ask: \"Is this headache similar? \"       No diagnosis    8. HEAD INJURY: \"Has there been any recent injury to the head? \"       denies    9. OTHER SYMPTOMS: \"Do you have any other symptoms? \" (fever, stiff neck, eye pain, sore throat, cold symptoms)       Eye pain on the right eye to the side but not behind the eye- superficial pain she states  Just got over a bad cold      10. PREGNANCY: \"Is there any chance you are pregnant? \" \"When was your last menstrual period? \"       N/a age    Protocols used: HEADACHE-ADULT-[place]    See above documentation

## 2021-11-02 NOTE — PROGRESS NOTES
Pt. Is here for cold symptoms x2wks, is taking sudafed. Pt. States last BS 1wk ago was 200.  Pt. Got flu and 3rd booster 10/28/21 at [facility]

## 2021-11-02 NOTE — PROGRESS NOTES
Michoacano Angeles is a 54 y.o. female who was seen today for an acute visit. Assessment & Plan:   1. Acute non-recurrent maxillary sinusitis    Orders Placed This Encounter    azithromycin (ZITHROMAX) 250 mg tablet        follow up if no improvement or any worsening of symptoms       Subjective: Michoacano Angeles was seen for Cold Symptoms  She reports 2 weeks had a cold and did have Covid testing at that time which she reports was negative. She seemed to improve last week except for some residual cough and then over the past 4 days has had right-sided sinus pressure with yellow nasal drainage and right-sided headache. Denies any fevers. No shortness of breath or chest pain    ROS - per HPI    Physical Exam  Vitals and nursing note reviewed. Constitutional:       General: She is not in acute distress. Appearance: She is well-developed. HENT:      Head: Normocephalic and atraumatic. Right Ear: Tympanic membrane and ear canal normal.      Left Ear: Tympanic membrane and ear canal normal.      Nose: Congestion present. Right Sinus: Maxillary sinus tenderness present. Mouth/Throat:      Pharynx: No oropharyngeal exudate or posterior oropharyngeal erythema. Eyes:      Conjunctiva/sclera: Conjunctivae normal.   Pulmonary:      Effort: Pulmonary effort is normal.      Breath sounds: Normal breath sounds. No wheezing or rales. Lymphadenopathy:      Cervical: No cervical adenopathy. Visit Vitals  /79 (BP 1 Location: Left upper arm, BP Patient Position: Sitting, BP Cuff Size: Adult)   Pulse 87   Temp 97.5 °F (36.4 °C) (Temporal)   Resp 16   Ht 4' 11\" (1.499 m)   Wt 160 lb (72.6 kg)   LMP 10/11/2021   SpO2 96%   BMI 32.32 kg/m²        Aspects of this note may have been generated using voice recognition software. Despite editing, there may be some syntax errors   I have discussed the diagnosis with the patient and the intended plan as seen in the above orders.   The patient has received an after-visit summary and questions were answered concerning future plans. I have discussed any recommended medication side effects and warnings with the patient as well.   She has expressed understanding of the diagnosis and plan    Brennan Dominguez MD

## 2021-11-04 ENCOUNTER — HOSPITAL ENCOUNTER (EMERGENCY)
Age: 51
Discharge: HOME | End: 2021-11-04
Attending: EMERGENCY MEDICINE
Payer: COMMERCIAL

## 2021-11-04 VITALS
HEIGHT: 61 IN | HEART RATE: 85 BPM | OXYGEN SATURATION: 99 % | TEMPERATURE: 98.4 F | WEIGHT: 162.04 LBS | BODY MASS INDEX: 30.59 KG/M2 | RESPIRATION RATE: 18 BRPM

## 2021-11-04 DIAGNOSIS — H65.191 OTHER NON-RECURRENT ACUTE NONSUPPURATIVE OTITIS MEDIA OF RIGHT EAR: Primary | ICD-10-CM

## 2021-11-04 LAB
GLUCOSE BLD STRIP.AUTO-MCNC: 282 MG/DL (ref 65–117)
SERVICE CMNT-IMP: ABNORMAL

## 2021-11-04 PROCEDURE — 74011250636 HC RX REV CODE- 250/636: Performed by: EMERGENCY MEDICINE

## 2021-11-04 PROCEDURE — 99283 EMERGENCY DEPT VISIT LOW MDM: CPT

## 2021-11-04 PROCEDURE — 82962 GLUCOSE BLOOD TEST: CPT

## 2021-11-04 PROCEDURE — 96372 THER/PROPH/DIAG INJ SC/IM: CPT

## 2021-11-04 RX ORDER — KETOROLAC TROMETHAMINE 30 MG/ML
30 INJECTION, SOLUTION INTRAMUSCULAR; INTRAVENOUS ONCE
Status: COMPLETED | OUTPATIENT
Start: 2021-11-04 | End: 2021-11-04

## 2021-11-04 RX ORDER — IBUPROFEN 800 MG/1
800 TABLET ORAL
Qty: 20 TABLET | Refills: 0 | Status: SHIPPED | OUTPATIENT
Start: 2021-11-04 | End: 2021-11-11

## 2021-11-04 RX ORDER — AMOXICILLIN 500 MG/1
500 TABLET, FILM COATED ORAL 3 TIMES DAILY
Qty: 21 TABLET | Refills: 0 | Status: SHIPPED | OUTPATIENT
Start: 2021-11-04 | End: 2021-11-11

## 2021-11-04 RX ADMIN — KETOROLAC TROMETHAMINE 30 MG: 30 INJECTION, SOLUTION INTRAMUSCULAR at 16:27

## 2021-11-04 NOTE — ED PROVIDER NOTES
EMERGENCY DEPARTMENT HISTORY AND PHYSICAL EXAM      Date: 2021  Patient Name: Rico Gallegos  Patient Age and Sex: 54 y.o. female  MRN:  [id number]  [facility]:  [id number]    History of Presenting Illness     Chief Complaint   Patient presents with    Headache     headache for one week after getting COVID shot       History Provided By: Patient    Ability to gather history was limited by:     HPI: Rico Gallegos, 54 y.o. female with history of diabetes, complains of pain to her right temple and right ear, for the past few days. She saw her primary care physician who prescribed azithromycin she is not had any vision changes or headache or neck pain. Pain described as burning and moderate severity, isolated mostly around the right temple and right ear, slightly around the mastoid process. Location:    Quality:      Severity:    Duration:   Timing:      Context:    Modifying factors:   Associated symptoms:     Past History      The patient's medical, surgical, and social history on file were reviewed by me today.  The family history was reviewed by me today and was non-contributory, unless otherwise specified below:    Past Medical History:  Past Medical History:   Diagnosis Date    Diabetes mellitus     Hyperlipidemia 2015       Past Surgical History:  Past Surgical History:   Procedure Laterality Date    HX GYN              Family History:  Family History   Problem Relation Age of Onset    Cancer Mother         lung    Hypertension Father     Cancer Maternal Grandfather         lung       Social History:  Social History     Tobacco Use    Smoking status: Never Smoker    Smokeless tobacco: Never Used   Vaping Use    Vaping Use: Never used   Substance Use Topics    Alcohol use: No     Alcohol/week: 0.0 standard drinks    Drug use: No       Current Medications:  No current facility-administered medications on file prior to encounter.      Current Outpatient Medications on File Prior to Encounter   Medication Sig Dispense Refill    [DISCONTINUED] azithromycin (ZITHROMAX) 250 mg tablet Take 2 tablets today, then take 1 tablet daily 6 Tablet 0    rosuvastatin (CRESTOR) 40 mg tablet Take 1 Tablet by mouth nightly. 90 Tablet 1    metFORMIN ER (GLUCOPHAGE XR) 500 mg tablet TAKE 4 TABLETS BY MOUTH DAILY WITH DINNER 360 Tablet 1    glucose blood VI test strips (OneTouch Ultra Blue Test Strip) strip Check blood sugar daily. 100 Strip 3    lancets (Lancets,Ultra Thin) misc CHECK BLOOD SUGAR AS DIRECTED 100 Each 3    acetaminophen (TYLENOL) 325 mg tablet Take  by mouth every four (4) hours as needed for Pain.  glucose blood VI test strips (BLOOD GLUCOSE TEST) strip Pharmacist to choose preferred meter and strips. Dx: 100 Strip 3    fluticasone (FLONASE) 50 mcg/actuation nasal spray 2 Sprays by Both Nostrils route daily. 1 Bottle 0    coenzyme q10-vitamin e (COQ10 ) 100-100 mg-unit cap Take 2 Caps by mouth daily.  multivitamin (ONE A DAY) tablet Take 1 Tab by mouth daily.  B.infantis-B.ani-B.long-B.bifi (PROBIOTIC 4X) 10-15 mg TbEC Take 3 Caps by mouth daily.  naproxen sodium (ALEVE) 220 mg cap Take 1 Cap by mouth daily as needed.  Cetirizine (ZYRTEC) 10 mg cap Take 1 Cap by mouth daily as needed. Allergies:  No Known Allergies  Review of Systems    A complete ROS was reviewed by me today and was negative, unless otherwise specified below:    Review of Systems   Constitutional: Negative for fatigue and fever. HENT: Positive for ear pain. Negative for facial swelling and trouble swallowing. Respiratory: Negative for shortness of breath. Neurological: Negative for headaches. All other systems reviewed and are negative. Physical Exam   Vital Signs  Patient Vitals for the past 8 hrs:   Temp Pulse Resp SpO2   11/04/21 1358 98.4 °F (36.9 °C) 85 18 99 %          Physical Exam  Vitals and nursing note reviewed.    Constitutional:       General: She is not in acute distress. Appearance: Normal appearance. She is well-developed. She is not ill-appearing. HENT:      Head: Normocephalic and atraumatic. Right Ear: Hearing normal. A middle ear effusion is present. No foreign body. There is mastoid tenderness. Tympanic membrane is bulging. Tympanic membrane is not injected, scarred, perforated, erythematous or retracted. Mouth/Throat:      Mouth: Mucous membranes are moist.   Eyes:      General:         Right eye: No discharge. Left eye: No discharge. Conjunctiva/sclera: Conjunctivae normal.   Cardiovascular:      Rate and Rhythm: Normal rate and regular rhythm. Heart sounds: Normal heart sounds. No murmur heard. Pulmonary:      Effort: Pulmonary effort is normal. No respiratory distress. Breath sounds: Normal breath sounds. No wheezing. Abdominal:      General: There is no distension. Palpations: Abdomen is soft. Tenderness: There is no abdominal tenderness. Musculoskeletal:         General: No deformity. Normal range of motion. Cervical back: Normal range of motion and neck supple. Skin:     General: Skin is warm and dry. Findings: No rash. Neurological:      General: No focal deficit present. Mental Status: She is alert and oriented to person, place, and time.    Psychiatric:         Speech: Speech normal.         Behavior: Behavior normal.         Cognition and Memory: Cognition normal.         Diagnostic Study Results   Labs  Recent Results (from the past 24 hour(s))   GLUCOSE, POC    Collection Time: 11/04/21  4:29 PM   Result Value Ref Range    Glucose (POC) 282 (H) 65 - 117 mg/dL    Performed by Teo Ladd LPN        Radiologic Studies  No orders to display     CT Results  (Last 48 hours)    None        CXR Results  (Last 48 hours)    None          Billable Procedures   Procedures    Medical Decision Making     I reviewed the patient's most recent Emergency Dept notes and diagnostic tests in formulating my MDM on today's visit. Provider Notes (Medical Decision Making):   30-year-old diabetic female with pain around the right ear, right mastoid process, and right temple. Clinically well-appearing, no distress, afebrile. She does not have significant swelling or erythema or tenderness over the mastoid process, and my clinical concern for mastoiditis is very low, can safely defer laboratories or imaging at this time. She appears to have a mild effusion of the right TM, could be acute otitis media. H&P is not strongly suggestive of temporal arteritis or trigeminal neuralgia. No sign of shingles. Nothing to suggest intracranial or ocular process. At this time would suggest a course of extra strength ibuprofen and switch azithromycin to amoxicillin, follow-up primary care. Riana Cantrell MD  4:41 PM  11/4/2021     Consults:    Social History     Tobacco Use    Smoking status: Never Smoker    Smokeless tobacco: Never Used   Vaping Use    Vaping Use: Never used   Substance Use Topics    Alcohol use: No     Alcohol/week: 0.0 standard drinks    Drug use: No       Medications Administered during ED course:  Medications   ketorolac (TORADOL) injection 30 mg (30 mg IntraMUSCular Given 11/4/21 162)          Prescriptions from today's ED visit:  Current Discharge Medication List      START taking these medications    Details   amoxicillin 500 mg tab Take 500 mg by mouth three (3) times daily for 7 days. Qty: 21 Tablet, Refills: 0  Start date: 11/4/2021, End date: 11/11/2021      ibuprofen (MOTRIN) 800 mg tablet Take 1 Tablet by mouth every six (6) hours as needed for Pain for up to 7 days. Qty: 20 Tablet, Refills: 0  Start date: 11/4/2021, End date: 11/11/2021            Diagnosis and Disposition     Disposition:  Discharged    Clinical Impression:   1.  Other non-recurrent acute nonsuppurative otitis media of right ear        Attestation:  I personally performed the services described in this documentation on this date 11/4/2021 for patient Tamanna Santiago. Nicole Franklin MD        I was the first provider for this patient on this visit. To the best of my ability I reviewed relevant prior medical records, electrocardiograms, laboratories, and radiologic studies. The patient's presenting problems were discussed, and the patient was in agreement with the care plan formulated and outlined with them. Nicole Franklin MD    Please note that this dictation was completed with Dragon voice recognition software. Quite often unanticipated grammatical, syntax, homophones, and other interpretive errors are inadvertently transcribed by the computer software. Please disregard these errors and excuse any errors that have escaped final proofreading.

## 2021-11-04 NOTE — DISCHARGE INSTRUCTIONS
The cause of your pain is not entirely clear. You may have an ear infection developing. We recommend that you stop taking the azithromycin and instead start taking amoxicillin 3 times a day, as well as extra strength ibuprofen 3 times a day. Follow-up with your primary care physician.

## 2021-12-24 ENCOUNTER — OFFICE VISIT (OUTPATIENT)
Dept: URGENT CARE | Age: 51
End: 2021-12-24
Payer: COMMERCIAL

## 2021-12-24 VITALS — TEMPERATURE: 97.9 F | RESPIRATION RATE: 18 BRPM | OXYGEN SATURATION: 97 % | HEART RATE: 77 BPM

## 2021-12-24 DIAGNOSIS — Z20.822 EXPOSURE TO COVID-19 VIRUS: Primary | ICD-10-CM

## 2021-12-24 LAB — SARS-COV-2 POC: NEGATIVE

## 2021-12-24 PROCEDURE — 87426 SARSCOV CORONAVIRUS AG IA: CPT | Performed by: FAMILY MEDICINE

## 2021-12-24 PROCEDURE — 99212 OFFICE O/P EST SF 10 MIN: CPT | Performed by: FAMILY MEDICINE

## 2021-12-24 NOTE — PROGRESS NOTES
This patient was seen at [address] [place] [address] [facility]gent [facility] while in their vehicle due to COVID-19 pandemic with PPE and focused examination in order to decrease community viral transmission. The patient/guardian gave verbal consent to treat. Michoacano Angeles is a 54 y.o. female who presents for COVID-19 testing. Was exposed to COVID-19 5 days ago. Denies cough, fever, SOB, N/V/D. The history is provided by the patient. Past Medical History:   Diagnosis Date    Diabetes mellitus     Hyperlipidemia 2015        Past Surgical History:   Procedure Laterality Date    HX GYN                Family History   Problem Relation Age of Onset    Cancer Mother         lung    Hypertension Father     Cancer Maternal Grandfather         lung        Social History     Socioeconomic History    Marital status:      Spouse name: Not on file    Number of children: Not on file    Years of education: Not on file    Highest education level: Not on file   Occupational History    Occupation:      Employer: [employer]   Tobacco Use    Smoking status: Never Smoker    Smokeless tobacco: Never Used   Vaping Use    Vaping Use: Never used   Substance and Sexual Activity    Alcohol use: No     Alcohol/week: 0.0 standard drinks    Drug use: No    Sexual activity: Yes     Partners: Male     Birth control/protection: None     Comment: single   Other Topics Concern    Not on file   Social History Narrative    Not on file     Social Determinants of Health     Financial Resource Strain:     Difficulty of Paying Living Expenses: Not on file   Food Insecurity:     Worried About Running Out of Food in the Last Year: Not on file    [place] of Food in the Last Year: Not on file   Transportation Needs:     Lack of Transportation (Medical): Not on file    Lack of Transportation (Non-Medical):  Not on file   Physical Activity:     Days of Exercise per Week: Not on file    Minutes of Exercise per Session: Not on file   Stress:     Feeling of Stress : Not on file   Social Connections:     Frequency of Communication with Friends and Family: Not on file    Frequency of Social Gatherings with Friends and Family: Not on file    Attends Scientologist Services: Not on file    Active Member of Clubs or Organizations: Not on file    Attends Club or Organization Meetings: Not on file    Marital Status: Not on file   Intimate Partner Violence:     Fear of Current or Ex-Partner: Not on file    Emotionally Abused: Not on file    Physically Abused: Not on file    Sexually Abused: Not on file   Housing Stability:     Unable to Pay for Housing in the Last Year: Not on file    Number of [place] in the Last Year: Not on file    Unstable Housing in the Last Year: Not on file                ALLERGIES: Patient has no known allergies. Review of Systems   Constitutional: Negative for activity change, appetite change and fever. Respiratory: Negative for cough and shortness of breath. Gastrointestinal: Negative for diarrhea, nausea and vomiting. Vitals:    12/24/21 0912   Pulse: 77   Resp: 18   Temp: 97.9 °F (36.6 °C)   SpO2: 97%       Physical Exam  Vitals and nursing note reviewed. Constitutional:       General: She is not in acute distress. Appearance: She is well-developed. She is not diaphoretic. Pulmonary:      Effort: Pulmonary effort is normal.   Neurological:      Mental Status: She is alert. Psychiatric:         Behavior: Behavior normal.         Thought Content: Thought content normal.         Judgment: Judgment normal.         MDM    ICD-10-CM ICD-9-CM   1. Exposure to COVID-19 virus  Z20.822 V01.79       Orders Placed This Encounter    AMB POC SARS-COV-2 ANTIGEN     Order Specific Question:   Is this test for diagnosis or screening? Answer:   Screening     Order Specific Question:   Symptomatic for COVID-19 as defined by CDC?      Answer:   No     Order Specific Question:   Hospitalized for COVID-19? Answer:   No     Order Specific Question:   Admitted to ICU for COVID-19? Answer:   No     Order Specific Question:   Employed in healthcare setting? Answer:   Unknown     Order Specific Question:   Resident in a congregate (group) care setting? Answer:   No     Order Specific Question:   Pregnant? Answer:   No     Order Specific Question:   Previously tested for COVID-19?      Answer:   Unknown        Results for orders placed or performed in visit on 12/24/21   AMB POC SARS-COV-2   Result Value Ref Range    SARS-COV-2 POC Negative Negative         Procedures

## 2022-01-19 NOTE — PROGRESS NOTES
HPI:  Judit Bryan is a 52y.o. year old female who returns to clinic today for follow up: Diabetes and hypercholesterolemia. BS: ranging  and on average 160's range. Following diabetic diet and tolerating metformin. Has lost 7 pounds. Exercise walking. Trying to follow a healthy diet lower in fat and cholesterol. Tolerating medications with no side effects. Current Outpatient Medications   Medication Sig Dispense Refill    lancets misc Check BS as directed 100 Each 1    metFORMIN ER (GLUCOPHAGE XR) 500 mg tablet Take 1 Tab by mouth daily (with dinner). 90 Tab 0    glucose blood VI test strips (BLOOD GLUCOSE TEST) strip Pharmacist to choose preferred meter and strips. Dx: 100 Strip 3    coenzyme q10-vitamin e (COQ10 ) 100-100 mg-unit cap Take 2 Caps by mouth daily.  multivitamin (ONE A DAY) tablet Take 1 Tab by mouth daily.  B.infantis-B.ani-B.long-B.bifi (PROBIOTIC 4X) 10-15 mg TbEC Take 3 Caps by mouth daily.  Cetirizine (ZYRTEC) 10 mg cap Take 1 Cap by mouth daily as needed.  acetaminophen (TYLENOL) 325 mg tablet Take  by mouth every four (4) hours as needed for Pain.  glucose blood VI test strips (ONETOUCH ULTRA TEST) strip Check BS BID as directed. 100 Strip 1    fluticasone (FLONASE) 50 mcg/actuation nasal spray 2 Sprays by Both Nostrils route daily. 1 Bottle 0    naproxen sodium (ALEVE) 220 mg cap Take 1 Cap by mouth daily as needed. No Known Allergies  Social History     Tobacco Use    Smoking status: Never Smoker    Smokeless tobacco: Never Used   Substance Use Topics    Alcohol use: No     Alcohol/week: 0.0 standard drinks         Review of Systems   Constitutional: Negative for malaise/fatigue. Respiratory: Negative for shortness of breath. Cardiovascular: Negative for chest pain and leg swelling. Gastrointestinal: Negative for abdominal pain and heartburn. Neurological: Negative for dizziness and headaches.      Physical Exam  Vitals signs and [facility]  OUTPATIENT CARDIOLOGY FOLLOW-UP VISIT    CHIEF COMPLAINT / REASON FOR FOLLOW-UP: CAD, HTN, dyslipidemia, former tobacco use, AGAPITO    CARDIOLOGIST: Dr. FREDDY Gunderson    I have reviewed and summarized all records as in problem list:    PROBLEM LIST:  1. Coronary artery disease:   a. coronary calcium score of 432.13 (greater than 75th percentile) in April 2010.    b. exercise MPI, 12/22/2015: No evidence of reversible ischemia or scar, exercised 7 minutes and 30 seconds (fair exercise tolerance) EF = 67%.   c. coronary calcium score of 744 (75th-90th percentile) in January 2017   d. cardiac catheterization, 04/25/2017: nonobstructive CAD, normal LVEDP   e. 2-D echo 01/04/2022: EF 55-60%. No WMA. Indeterminate diastolic function   f.  nuclear stress test 12/20/2021: negative for fixed or reversible defect  2. Strong family history of CAD  3. Essential hypertension  4. Dyslipidemia  5. Irritable bowel syndrome  6. Obstructive sleep apnea: on CPAP therapy   7. Carotid duplex, 11/15/2015: no significant stenosis bilaterally < 50%, similar to 2010.  8. Longstanding tobacco use; quit 11/2021  9. COPD  10. Anxiety  11. Macrocytic anemia    Diagnostics:  2-D echo 01/04/2022: normal left ventricular cavity size. Normal left ventricular wall thickness. Normal left ventricular systolic function. EF 5560%. No regional wall motion abnormalities. Indeterminate diastolic function tissue. Doppler suboptimal. Right ventricle not well visualized. Grossly normal RV size and function. RVSP could not be calculated due to incomplete tricuspid regurgitation velocity profile.    Nuclear stress test 12/20/2021:  1. Normal myocardial perfusion scans following a regadenoson injection and at rest.  2. Normal resting and post-regadenoson LV systolic function.    Nuclear stress test 12/22/2015:  1.  Normal myocardial perfusion imaging at rest and following Regadenoson stress.  2.  Normal myocardial wall thickening and motion,  computer calculated ejection fraction 67%.  3.  Normal ECG with Regadenoson infusion.    HISTORY OF PRESENT ILLNESS:   Alexis Tanner is a 75 year old male who presents today for follow up visit, ambulating independently and is unaccompanied.    He noted costochondral pain and visited with Dr. Tolbert who performed a manipulation with significant improvement in symptoms, almost complete resolution.     He underwent stress test 01/10/2022 which was negative for ischemia or scar.    He denies chest pain any longer and also denies chest pressure or heaviness at rest or with exertion. He denies shortness of breath at rest or dyspnea on exertion.  He uses his inhaler occasionally.    He denies palpitations.  He notes positional lightheadedness and understands to change positions slowly. He feels that symptoms are due to taking diuretic. He denies pre-syncope or syncope.    He tries to remains active and walks routinely. He denies any decrease in activity tolerance.     He denies lower extremity edema bilaterally. He sleeps well at night and denies PND or orthopnea. He is compliant with using CPAP.    Home weight 235 pounds. His home blood pressure readings do not correlate with clinic readings and are, therefore, inaccurate.    MEDICATIONS:  Current Outpatient Medications   Medication Sig Dispense Refill   • clotrimazole-betamethasone (LOTRISONE) 1-0.05 % cream Apply 1 application topically 2 times daily. For up to 14 days 45 g 1   • atenolol (TENORMIN) 50 MG tablet Take 1 tablet by mouth daily. 90 tablet 1   • rosuvastatin (CRESTOR) 40 MG tablet Take 1 tablet by mouth daily 90 tablet 3   • nystatin (MYCOSTATIN) 689053 UNIT/ML suspension Rinse with 1 teaspoon (5ml)  for 2 minutes 4-5 times daily and expectorate 300 mL 2   • lisinopril (ZESTRIL) 10 MG tablet Take 2 tablets by mouth daily. 180 tablet 1   • DIAZepam (VALIUM) 10 MG tablet TAKE 1 TABLET THE NIGHT BEFORE APPT AND THE OTHER TABLET AN HOUR BEFORE APPT. 2  nursing note reviewed. Constitutional:       General: She is not in acute distress. Appearance: She is well-developed. HENT:      Head: Normocephalic and atraumatic. Cardiovascular:      Rate and Rhythm: Normal rate and regular rhythm. Pulmonary:      Effort: Pulmonary effort is normal.      Breath sounds: Normal breath sounds. No wheezing. Abdominal:      General: Bowel sounds are normal. There is no distension. Palpations: Abdomen is soft. There is no mass. Tenderness: There is no abdominal tenderness. Visit Vitals  /76 (BP 1 Location: Right arm)   Pulse 73   Temp 98.2 °F (36.8 °C) (Oral)   Resp 18   Ht 5' 1\" (1.549 m)   Wt 157 lb (71.2 kg)   LMP 02/03/2020   SpO2 97%   BMI 29.66 kg/m²       Assessment & Plan:    ICD-10-CM ICD-9-CM    1. Type 2 diabetes mellitus without complication, without long-term current use of insulin (HCC)  Not at goal and will increase metformin ER to 500 mg 2 tabs at dinner. C45.9 513.35 METABOLIC PANEL, COMPREHENSIVE      HEMOGLOBIN A1C WITH EAG      METABOLIC PANEL, COMPREHENSIVE   2. Hyperlipidemia, unspecified hyperlipidemia type  Not at goal and will start Crestor 20 mg daily. Reviewed potential side effects of medications. E78.5 272.4 LIPID PANEL      METABOLIC PANEL, COMPREHENSIVE     Orders Placed This Encounter    METABOLIC PANEL, COMPREHENSIVE    LIPID PANEL    HEMOGLOBIN A1C WITH EAG    METABOLIC PANEL, COMPREHENSIVE    rosuvastatin (CRESTOR) 20 mg tablet    metFORMIN ER (GLUCOPHAGE XR) 500 mg tablet       Follow-up in 6 weeks     Advised her to call back or return to office if symptoms worsen/change/persist.  Discussed expected course/resolution/complications of diagnosis in detail with patient. Medication risks/benefits/costs/interactions/alternatives discussed with patient. She was given an after visit summary which includes diagnoses, current medications, & vitals. She expressed understanding with the diagnosis and plan. tablet 2   • ketoconazole (NIZORAL) 2 % cream Apply daily topically to rash around groin 60 g 0   • sodium fluoride (SF 5000 Plus) 1.1 % dental cream Brush for 2 minutes every night. Do not rinse after using 51 g 12   • Cyanocobalamin (B-12 PO) Take 1 capsule by mouth daily.      • aspirin (ECOTRIN) 81 MG EC tablet Take 81 mg by mouth daily.     • hydrOXYzine (ATARAX) 10 MG tablet Take 1 tablet by mouth daily as needed for Anxiety. 180 tablet 2   • albuterol 108 (90 Base) MCG/ACT inhaler Inhale 2 puffs into the lungs every 6 hours as needed for Shortness of Breath or Wheezing. 8.5 g 11   • VITAMIN D, ERGOCALCIFEROL, PO Take 1 tablet by mouth daily.     • Blood Pressure Monitoring (BLOOD PRESSURE MONITOR/M CUFF) Misc USE FOR TESTING BLOOD PRESSURE 1 each 0   • indapamide (LOZOL) 1.25 MG tablet Take 1 tablet by mouth every morning. 90 tablet 1     No current facility-administered medications for this visit.       ALLERGIES:  ALLERGIES:   Allergen Reactions   • Penicillins RASH        SOCIAL HISTORY:  Social History     Tobacco Use   • Smoking status: Light Tobacco Smoker     Packs/day: 1.00     Years: 40.00     Pack years: 40.00     Types: Cigarettes     Start date: 1979   • Smokeless tobacco: Never Used   • Tobacco comment: Quit x 2 weeks   Vaping Use   • Vaping Use: never used   Substance Use Topics   • Alcohol use: Yes     Comment: Moderate   • Drug use: No        FAMILY HISTORY:  Family History   Problem Relation Age of Onset   • Heart disease Other         Father 49, brother 50 and sister 29  from heart attack   • Heart disease Father    • Heart disease Sister    • Heart disease Brother    • Asthma Mother         REVIEW OF SYSTEMS:  A 10 point ROS was done and is negative unless otherwise stated in the HPI. In addition, see ROS note.    PHYSICAL EXAMINATION:  Well developed male who is in no acute distress.   VITALS:   Visit Vitals  /81 (BP Location: LUE - Left upper extremity, Patient Position:  Sitting, Cuff Size: Large Adult)   Pulse 62   Resp 16   Ht 5' 10\" (1.778 m)   Wt 106 kg (233 lb 11.2 oz)   SpO2 98%   BMI 33.53 kg/m²      General: Cooperative, sitting comfortably.  HEAD: Normocephalic and atraumatic.   Neck: Trachea mid line.   Respiratory: Bilateral air entry present. Clear to auscultation bilaterally. Respirations regular and unlabored. No wheezes, rales, rhonchi or crackles. Good respiratory effort.   Cardiovascular: Regular rate and rhythm. Normal S1 and S2. No murmurs, rubs, or gallops. No jugular venous distension. The carotid pulses are 2+ bilaterally. No carotid bruits auscultated.   Abdominal: Abdomen soft, nontender, nondistended. No rebound or guarding. Normal  bowel sounds.   Extremities: No cyanosis, clubbing or edema.  Lymphatic: No significant lymphadenopathy in submental, submandibular, or cervical chain.   Neurologic/psych: Alert and oriented x3. The patient is cooperative and pleasant. No gross sensory deficits noted. No apparent focal motor  deficits.   Skin/ integumentary: Warm and dry skin. No rashes.    LABS:   Cholesterol (mg/dL)   Date Value   05/25/2021 112     Triglycerides (mg/dL)   Date Value   05/25/2021 221 (H)     HDL (mg/dL)   Date Value   05/25/2021 42     LDL (mg/dL)   Date Value   05/25/2021 26      Lab Results   Component Value Date    WBC 6.2 06/15/2021    WBC 5.8 05/25/2021    WBC 6.9 12/12/2019    WBC 6.3 01/14/2019    HGB 14.6 06/15/2021    HGB 13.4 05/25/2021    HGB 13.7 12/12/2019    HGB 13.8 01/14/2019    HCT 43.5 06/15/2021    HCT 39.7 05/25/2021    HCT 39.5 12/12/2019    HCT 40.7 01/14/2019     06/15/2021     05/25/2021     12/12/2019     01/14/2019    INR 1.0 04/25/2017    POTASSIUM 4.4 12/09/2021    POTASSIUM 3.9 06/15/2021    POTASSIUM 3.7 12/16/2019    POTASSIUM 3.3 (L) 12/12/2019    BUN 7 12/09/2021    BUN 9 06/15/2021    BUN 14 12/12/2019    BUN 13 05/22/2019    CREATININE 0.82 12/09/2021    CREATININE 0.76 06/15/2021     CREATININE 0.98 12/12/2019    CREATININE 0.79 05/22/2019    TSH 1.475 06/15/2021    TSH 1.356 07/23/2015       ECG 06/15/2021  Rate:            71                       P:            51   MD:             148                      QRS:          41   QRSD:         90                       T:            34   QT:             422                                       QTc:           458                                       Normal sinus rhythm   Borderline nonspecific intra ventricular conduction delay       ASSESSMENT:  1. Coronary artery disease, non obstructive  2. Essential Hypertension  3. Dyslipidemia  4. Former tobacco use  5. Obstructive sleep apnea    PLAN:  Coronary artery disease, non obstructive: he notes chest pain worsening in occurrence over the past couple months, noted mostly when shoveling. He believes symptoms are not necessarily worsening in intensity, frequency or duration but was vague and unsure. He denies dyspnea at rest but continues to experience dyspnea on exertion which is unchanged as compared to previous, usually controlled with use of inhalers. He was able to walk a mile at last visit but is now unable to fatigue and tiredness.     Coronary calcium score 04/2010: 432.   Exercise stress test 12/22/2015: no evidence of reversible ischemia or scar.   Repeat coronary calcium score 10/2017: 744.   Cardiac catheterization 04/2017: nonobstructive CAD with normal LVEDP.   2-D echo 01/04/2022: EF 55-60%. No WMA. Indeterminate diastolic function  Nuclear stress test 12/20/2021: negative for fixed or reversible defect    Echo and nuclear stress test results discussed in detail with patient today.    He denies any further chest pain. No dyspnea at rest or with exertion. He is able to perform all his activities without limitations. He will continue BB, statin, ASA. Continue medical management and aggressive risk factor modification with exercise and weight loss. No further cardiac work-up is  needed.     Essential Hypertension: blood pressure 135/81 mmHg, controlled on atenolol, lisinopril. He stopped chlorthalidone due to postural symptoms, now experiencing same with indapamide. He will take indapamide and if symptoms return, he will stop taking and will let Dr. Tolbert know. Advised to monitor home blood pressures and heart rates - consider purchasing a new cuff.     Dyslipidemia: LDL 26, triglyceride 221, hypertriglyceridemia, on Crestor. Advised to decrease carbohydrates and sugar intake in diet     Former tobacco use: he was congratulated for continued tobacco cessation.    Obstructive sleep apnea: compliant with CPAP therapy, follows with Sleep Lab      Patient understands he/she can return sooner if any issues should arise.     FOLLOW-UP: 6 months with cardiology, or sooner if needed.      CORTEZ Juarez  [facility] Cardiology

## 2022-02-16 ENCOUNTER — TELEPHONE (OUTPATIENT)
Dept: INTERNAL MEDICINE CLINIC | Age: 52
End: 2022-02-16

## 2022-03-24 ENCOUNTER — OFFICE VISIT (OUTPATIENT)
Dept: INTERNAL MEDICINE CLINIC | Age: 52
End: 2022-03-24
Payer: COMMERCIAL

## 2022-03-24 VITALS
HEART RATE: 68 BPM | DIASTOLIC BLOOD PRESSURE: 73 MMHG | RESPIRATION RATE: 16 BRPM | OXYGEN SATURATION: 99 % | TEMPERATURE: 97.1 F | HEIGHT: 60 IN | SYSTOLIC BLOOD PRESSURE: 114 MMHG | BODY MASS INDEX: 31.77 KG/M2 | WEIGHT: 161.8 LBS

## 2022-03-24 DIAGNOSIS — Z12.11 SCREEN FOR COLON CANCER: ICD-10-CM

## 2022-03-24 DIAGNOSIS — E78.5 HYPERLIPIDEMIA, UNSPECIFIED HYPERLIPIDEMIA TYPE: ICD-10-CM

## 2022-03-24 DIAGNOSIS — E11.9 TYPE 2 DIABETES MELLITUS WITHOUT COMPLICATION, WITH LONG-TERM CURRENT USE OF INSULIN (HCC): ICD-10-CM

## 2022-03-24 DIAGNOSIS — Z00.00 ROUTINE GENERAL MEDICAL EXAMINATION AT A HEALTH CARE FACILITY: Primary | ICD-10-CM

## 2022-03-24 DIAGNOSIS — Z79.4 TYPE 2 DIABETES MELLITUS WITHOUT COMPLICATION, WITH LONG-TERM CURRENT USE OF INSULIN (HCC): ICD-10-CM

## 2022-03-24 PROCEDURE — 99396 PREV VISIT EST AGE 40-64: CPT | Performed by: INTERNAL MEDICINE

## 2022-03-24 RX ORDER — METFORMIN HYDROCHLORIDE 500 MG/1
TABLET, EXTENDED RELEASE ORAL
Qty: 360 TABLET | Refills: 1 | Status: SHIPPED | OUTPATIENT
Start: 2022-03-24 | End: 2022-08-04 | Stop reason: SDUPTHER

## 2022-03-24 RX ORDER — ROSUVASTATIN CALCIUM 40 MG/1
40 TABLET, COATED ORAL
Qty: 90 TABLET | Refills: 1 | Status: SHIPPED | OUTPATIENT
Start: 2022-03-24 | End: 2022-08-04

## 2022-03-24 RX ORDER — INSULIN PUMP SYRINGE, 3 ML
EACH MISCELLANEOUS
Qty: 1 KIT | Refills: 0 | Status: SHIPPED | OUTPATIENT
Start: 2022-03-24

## 2022-03-24 NOTE — PROGRESS NOTES
Pino Walden is a 50 y.o. female who was seen in clinic today (3/24/2022) for a full physical.       Assessment & Plan:   Below is the assessment and plan developed based on review of pertinent history, physical exam, labs, studies, and medications. 1. Routine general medical examination at a health care facility  Health maintenance reviewed and updated with patient today at visit. -     CBC WITH AUTOMATED DIFF; Future  -     LIPID PANEL; Future  -     METABOLIC PANEL, COMPREHENSIVE; Future  2. Type 2 diabetes mellitus without complication, with long-term current use of insulin ([facility] [address]  Counseled regarding significant hemoglobin A1c elevation in September 2021. She did not see Pharm. D. or start Onglyza. Reviewed with her long-term end organ damage that can occur from poorly controlled diabetes. She states her understanding. We will continue to work on diabetic diet. Continue current medication pending results of her A1c. Also sent and ordered for glucometer as she had lost hers and has not been checking her blood sugars  -     HEMOGLOBIN A1C WITH EAG; Future  -     METABOLIC PANEL, COMPREHENSIVE; Future  -     MICROALBUMIN, UR, RAND W/ MICROALB/CREAT RATIO; Future  -     metFORMIN ER (GLUCOPHAGE XR) 500 mg tablet; TAKE 4 TABLETS BY MOUTH DAILY WITH DINNER, Normal, Disp-360 Tablet, R-1  3. Hyperlipidemia, unspecified hyperlipidemia type  Check lab work and continue with current medication. 4. Screen for colon cancer  -     REFERRAL TO GASTROENTEROLOGY       Subjective: [facility] is here today for a full physical.      Since last visit: Has been doing well. She has not been checking her blood sugars. Her A1c when it was checked in September was very high and had recommended Onglyza and appointment with Pharm. D. which she did not pursue. She is also here for follow-up on diabetes and hypercholesterolemia. Due for repeat lab work. Starting to have some hot flashes and missed her menses last month.  Has had some mood swings with some irritability but denies depression. Taking medication with no side effects. Exercise: walking/jogging Diet diabetic diet sometimes. Health Maintenance  Immunizations:   Covid: up to date  Influenza: up to date   Tetanus: up to date        Cancer screening:   Cervical: reviewed guidelines, UTD, done by OBGYN, records requested  Breast: reviewed guidelines, UTD, done by OBGYN, records requested. The following sections were reviewed & updated as appropriate: Problem List, Allergies, PMH, PSH, FH, and SH. Prior to Admission medications    Medication Sig Start Date End Date Taking? Authorizing Provider   metFORMIN ER (GLUCOPHAGE XR) 500 mg tablet TAKE 4 TABLETS BY MOUTH DAILY WITH DINNER 3/21/22  Yes Emre Tapia MD   rosuvastatin (CRESTOR) 40 mg tablet Take 1 Tablet by mouth nightly. 9/21/21  Yes Emre Tapia MD   glucose blood VI test strips (OneTouch Ultra Blue Test Strip) strip Check blood sugar daily. 5/6/21  Yes Emre Tapia MD   lancfrank [place]) [facility] CHECK BLOOD SUGAR AS DIRECTED 4/6/20  Yes Emre Tapia MD   acetaminophen (TYLENOL) 325 mg tablet Take  by mouth every four (4) hours as needed for Pain. Yes Provider, Historical   glucose blood VI test strips (BLOOD GLUCOSE TEST) strip Pharmacist to choose preferred meter and strips. Dx: 12/20/19  Yes Emre Tapia MD   fluticasone (FLONASE) 50 mcg/actuation nasal spray 2 Sprays by Both Nostrils route daily. 2/6/19  Yes Navneet Arroyo MD   coenzyme q10-vitamin e (COQ10 ) 100-100 mg-unit cap Take 2 Caps by mouth daily. Yes Provider, Historical   multivitamin (ONE A DAY) tablet Take 1 Tab by mouth daily. Yes Provider, Historical   B.infantis-B.ani-B.long-B.bifi (PROBIOTIC 4X) 10-15 mg TbEC Take 3 Caps by mouth daily. Yes Provider, Historical   naproxen sodium (ALEVE) 220 mg cap Take 1 Cap by mouth daily as needed.    Yes Provider, Historical   Cetirizine (ZYRTEC) 10 mg cap Take 1 Capsule by mouth daily as needed. Yes Provider, Historical      Review of Systems   Constitutional: Negative for fever, malaise/fatigue and weight loss. HENT: Negative for congestion and sore throat. Eyes: Negative. Respiratory: Negative for cough and shortness of breath. Cardiovascular: Negative for chest pain and leg swelling. Gastrointestinal: Negative for abdominal pain, blood in stool, constipation, diarrhea, heartburn and nausea. Genitourinary: Negative for dysuria, frequency and urgency. Musculoskeletal: Negative for back pain and joint pain. Skin: Negative for rash. Neurological: Negative for dizziness, sensory change, focal weakness and headaches. Psychiatric/Behavioral: Negative for depression. Objective:   Physical Exam  Vitals and nursing note reviewed. Constitutional:       General: She is not in acute distress. Appearance: She is well-developed. HENT:      Head: Normocephalic and atraumatic. Right Ear: Tympanic membrane and ear canal normal.      Left Ear: Tympanic membrane and ear canal normal.      Nose: Nose normal.   Eyes:      Conjunctiva/sclera: Conjunctivae normal.      Pupils: Pupils are equal, round, and reactive to light. Neck:      Thyroid: No thyromegaly. Cardiovascular:      Rate and Rhythm: Normal rate and regular rhythm. Heart sounds: Normal heart sounds. No murmur heard. Pulmonary:      Effort: Pulmonary effort is normal.      Breath sounds: Normal breath sounds. Chest:      Comments: Breast exam: breasts appear normal, no suspicious masses, no skin or nipple changes or axillary nodes. Abdominal:      General: Bowel sounds are normal.      Palpations: Abdomen is soft. There is no mass. Tenderness: There is no abdominal tenderness. Musculoskeletal:      Cervical back: Normal range of motion. Right lower leg: No edema. Left lower leg: No edema. Lymphadenopathy:      Cervical: No cervical adenopathy. Skin:     Findings: No rash. Neurological:      Cranial Nerves: No cranial nerve deficit. Sensory: No sensory deficit. Psychiatric:         Mood and Affect: Mood normal.        Monofilament intact bilaterally. Pulses 2+ bilaterally. No skin lesions or open wounds.        Visit Vitals  /73 (BP 1 Location: Right arm, BP Patient Position: Sitting, BP Cuff Size: Large adult)   Pulse 68   Temp 97.1 °F (36.2 °C) (Temporal)   Resp 16   Ht 4' 11.96\" (1.523 m)   Wt 161 lb 12.8 oz (73.4 kg)   LMP 01/24/2022   SpO2 99%   BMI 31.64 kg/m²          Hernan Siddiqi MD

## 2022-03-25 LAB
ALBUMIN SERPL-MCNC: 4.3 G/DL (ref 3.5–5)
ALBUMIN/GLOB SERPL: 1.6 {RATIO} (ref 1.1–2.2)
ALP SERPL-CCNC: 78 U/L (ref 45–117)
ALT SERPL-CCNC: 42 U/L (ref 12–78)
ANION GAP SERPL CALC-SCNC: 9 MMOL/L (ref 5–15)
AST SERPL-CCNC: 14 U/L (ref 15–37)
BASOPHILS # BLD: 0.1 K/UL (ref 0–0.1)
BASOPHILS NFR BLD: 1 % (ref 0–1)
BILIRUB SERPL-MCNC: 0.7 MG/DL (ref 0.2–1)
BUN SERPL-MCNC: 16 MG/DL (ref 6–20)
BUN/CREAT SERPL: 24 (ref 12–20)
CALCIUM SERPL-MCNC: 8.7 MG/DL (ref 8.5–10.1)
CHLORIDE SERPL-SCNC: 99 MMOL/L (ref 97–108)
CHOLEST SERPL-MCNC: 94 MG/DL
CO2 SERPL-SCNC: 24 MMOL/L (ref 21–32)
CREAT SERPL-MCNC: 0.68 MG/DL (ref 0.55–1.02)
CREAT UR-MCNC: 55.2 MG/DL
DIFFERENTIAL METHOD BLD: NORMAL
EOSINOPHIL # BLD: 0.3 K/UL (ref 0–0.4)
EOSINOPHIL NFR BLD: 4 % (ref 0–7)
ERYTHROCYTE [DISTWIDTH] IN BLOOD BY AUTOMATED COUNT: 12.6 % (ref 11.5–14.5)
EST. AVERAGE GLUCOSE BLD GHB EST-MCNC: 303 MG/DL
GLOBULIN SER CALC-MCNC: 2.7 G/DL (ref 2–4)
GLUCOSE SERPL-MCNC: 261 MG/DL (ref 65–100)
HBA1C MFR BLD: 12.2 % (ref 4–5.6)
HCT VFR BLD AUTO: 41.3 % (ref 35–47)
HDLC SERPL-MCNC: 45 MG/DL
HDLC SERPL: 2.1 {RATIO} (ref 0–5)
HGB BLD-MCNC: 13.5 G/DL (ref 11.5–16)
IMM GRANULOCYTES # BLD AUTO: 0 K/UL (ref 0–0.04)
IMM GRANULOCYTES NFR BLD AUTO: 0 % (ref 0–0.5)
LDLC SERPL CALC-MCNC: 24.2 MG/DL (ref 0–100)
LYMPHOCYTES # BLD: 2.7 K/UL (ref 0.8–3.5)
LYMPHOCYTES NFR BLD: 41 % (ref 12–49)
MCH RBC QN AUTO: 27.3 PG (ref 26–34)
MCHC RBC AUTO-ENTMCNC: 32.7 G/DL (ref 30–36.5)
MCV RBC AUTO: 83.6 FL (ref 80–99)
MICROALBUMIN UR-MCNC: 6.93 MG/DL
MICROALBUMIN/CREAT UR-RTO: 126 MG/G (ref 0–30)
MONOCYTES # BLD: 0.4 K/UL (ref 0–1)
MONOCYTES NFR BLD: 6 % (ref 5–13)
NEUTS SEG # BLD: 3.2 K/UL (ref 1.8–8)
NEUTS SEG NFR BLD: 48 % (ref 32–75)
NRBC # BLD: 0 K/UL (ref 0–0.01)
NRBC BLD-RTO: 0 PER 100 WBC
PLATELET # BLD AUTO: 268 K/UL (ref 150–400)
PMV BLD AUTO: 9.8 FL (ref 8.9–12.9)
POTASSIUM SERPL-SCNC: 3.9 MMOL/L (ref 3.5–5.1)
PROT SERPL-MCNC: 7 G/DL (ref 6.4–8.2)
RBC # BLD AUTO: 4.94 M/UL (ref 3.8–5.2)
SODIUM SERPL-SCNC: 132 MMOL/L (ref 136–145)
TRIGL SERPL-MCNC: 124 MG/DL (ref ?–150)
VLDLC SERPL CALC-MCNC: 24.8 MG/DL
WBC # BLD AUTO: 6.6 K/UL (ref 3.6–11)

## 2022-03-30 NOTE — PROGRESS NOTES
Notify patient her diabetes is poorly controlled with hemoglobin A1c at 12.2. See my recent office note. I would like her to meet with Keli Reyez to talk about her diabetic diet and medications. I would like to start her on Onglyza and will need education regarding this. She needs a follow-up appointment with me in 3 months after starting Onglyza. Mervin Lomas please schedule this.

## 2022-04-01 ENCOUNTER — TELEPHONE (OUTPATIENT)
Dept: INTERNAL MEDICINE CLINIC | Age: 52
End: 2022-04-01

## 2022-04-01 NOTE — TELEPHONE ENCOUNTER
Pharmacy Progress Note - Telephone Call    Ms. Ang Hernandez 50 y.o. was contacted via an outbound telephone call regarding scheduling DM education and management visit today. A voicemail was left for patient to return my call. This writer's work mobile number was provided as a callback contact - [phone]. Sarbjit Rader, PharmD, [facility]  Clinical Pharmacist Specialist        For Pharmacy Admin Tracking Only     CPA in place:  Yes   Recommendation Provided To: Patient/Caregiver: 0 via Telephone   Intervention Accepted By: Patient/Caregiver: 0   Time Spent (min): 5

## 2022-04-06 ENCOUNTER — TELEPHONE (OUTPATIENT)
Dept: INTERNAL MEDICINE CLINIC | Age: 52
End: 2022-04-06

## 2022-05-17 DIAGNOSIS — E11.9 CONTROLLED TYPE 2 DIABETES MELLITUS WITHOUT COMPLICATION, WITHOUT LONG-TERM CURRENT USE OF INSULIN (HCC): ICD-10-CM

## 2022-05-17 RX ORDER — BLOOD SUGAR DIAGNOSTIC
STRIP MISCELLANEOUS
Qty: 100 STRIP | Refills: 3 | Status: SHIPPED | OUTPATIENT
Start: 2022-05-17 | End: 2022-08-04 | Stop reason: SDUPTHER

## 2022-05-28 ENCOUNTER — OFFICE VISIT (OUTPATIENT)
Dept: URGENT CARE | Age: 52
End: 2022-05-28
Payer: COMMERCIAL

## 2022-05-28 VITALS
WEIGHT: 160 LBS | DIASTOLIC BLOOD PRESSURE: 75 MMHG | OXYGEN SATURATION: 97 % | BODY MASS INDEX: 30.21 KG/M2 | RESPIRATION RATE: 18 BRPM | SYSTOLIC BLOOD PRESSURE: 134 MMHG | TEMPERATURE: 98.2 F | HEIGHT: 61 IN | HEART RATE: 92 BPM

## 2022-05-28 DIAGNOSIS — N39.0 URINARY TRACT INFECTION WITHOUT HEMATURIA, SITE UNSPECIFIED: Primary | ICD-10-CM

## 2022-05-28 DIAGNOSIS — E11.65 TYPE 2 DIABETES MELLITUS WITH HYPERGLYCEMIA, WITHOUT LONG-TERM CURRENT USE OF INSULIN (HCC): ICD-10-CM

## 2022-05-28 LAB
BILIRUB UR QL STRIP: NEGATIVE
GLUCOSE UR-MCNC: NORMAL MG/DL
KETONES P FAST UR STRIP-MCNC: NORMAL MG/DL
PH UR STRIP: 5.5 [PH] (ref 4.6–8)
PROT UR QL STRIP: NEGATIVE
SP GR UR STRIP: 1.02 (ref 1–1.03)
UA UROBILINOGEN AMB POC: NORMAL (ref 0.2–1)
URINALYSIS CLARITY POC: NORMAL
URINALYSIS COLOR POC: YELLOW
URINE BLOOD POC: NEGATIVE
URINE LEUKOCYTES POC: NEGATIVE
URINE NITRITES POC: NEGATIVE

## 2022-05-28 PROCEDURE — 81003 URINALYSIS AUTO W/O SCOPE: CPT | Performed by: NURSE PRACTITIONER

## 2022-05-28 PROCEDURE — 3046F HEMOGLOBIN A1C LEVEL >9.0%: CPT | Performed by: NURSE PRACTITIONER

## 2022-05-28 PROCEDURE — 99213 OFFICE O/P EST LOW 20 MIN: CPT | Performed by: NURSE PRACTITIONER

## 2022-05-28 NOTE — PATIENT INSTRUCTIONS
Your blood sugar is too high. I have provided a temporary prescription for an oral medication to help bring your sugar down. This is a temporary fix. Based on your last A1c and your high blood sugar today, you likely need to start insulin. You should talk to your PCP about this.

## 2022-05-28 NOTE — PROGRESS NOTES
49 yo F presents to  with cc of urinary frequency, urgency, dysuria x2 days. No fever, no suprapubic pain. No additional systemic symptoms. Chart reviewed. Reveals A1c of 12.2 3 and on metformin 2g in isolation. Reports she missed this med last night. Past Medical History:   Diagnosis Date    Diabetes mellitus     Hyperlipidemia 2015        Past Surgical History:   Procedure Laterality Date    HX GYN                Family History   Problem Relation Age of Onset    Cancer Mother         lung    Hypertension Father     Cancer Maternal Grandfather         lung        Social History     Socioeconomic History    Marital status:      Spouse name: Not on file    Number of children: Not on file    Years of education: Not on file    Highest education level: Not on file   Occupational History    Occupation:      Employer: [employer]   Tobacco Use    Smoking status: Never Smoker    Smokeless tobacco: Never Used   Vaping Use    Vaping Use: Never used   Substance and Sexual Activity    Alcohol use: No     Alcohol/week: 0.0 standard drinks    Drug use: No    Sexual activity: Yes     Partners: Male     Birth control/protection: None     Comment: single   Other Topics Concern    Not on file   Social History Narrative    Not on file     Social Determinants of Health     Financial Resource Strain:     Difficulty of Paying Living Expenses: Not on file   Food Insecurity:     Worried About Running Out of Food in the Last Year: Not on file    [place] of Food in the Last Year: Not on file   Transportation Needs:     Lack of Transportation (Medical): Not on file    Lack of Transportation (Non-Medical):  Not on file   Physical Activity:     Days of Exercise per Week: Not on file    Minutes of Exercise per Session: Not on file   Stress:     Feeling of Stress : Not on file   Social Connections:     Frequency of Communication with Friends and Family: Not on file    Frequency of Social Gatherings with Friends and Family: Not on file    Attends [employer] Services: Not on file    Active Member of Clubs or Organizations: Not on file    Attends Club or Organization Meetings: Not on file    Marital Status: Not on file   Intimate Partner Violence:     Fear of Current or Ex-Partner: Not on file    Emotionally Abused: Not on file    Physically Abused: Not on file    Sexually Abused: Not on file   Housing Stability:     Unable to Pay for Housing in the Last Year: Not on file    Number of [place] in the Last Year: Not on file    Unstable Housing in the Last Year: Not on file                ALLERGIES: Patient has no known allergies. Review of Systems   Constitutional: Negative. Genitourinary: Positive for frequency and urgency. All other systems reviewed and are negative. Vitals:    05/28/22 1535 05/28/22 1536   BP:  134/75   Pulse:  92   Resp:  18   Temp:  98.2 °F (36.8 °C)   SpO2:  97%   Weight: 160 lb (72.6 kg)    Height: 5' 1\" (1.549 m)        Physical Exam  Vitals and nursing note reviewed. Constitutional:       General: She is not in acute distress. Appearance: Normal appearance. She is obese. She is not ill-appearing. HENT:      Head: Normocephalic and atraumatic. Nose: Nose normal.      Mouth/Throat:      Mouth: Mucous membranes are moist.   Pulmonary:      Effort: Pulmonary effort is normal.      Breath sounds: Normal breath sounds. Abdominal:      Tenderness: There is no right CVA tenderness or left CVA tenderness. Skin:     General: Skin is warm and dry. Neurological:      Mental Status: She is alert. MDM     Differential Diagnosis; Clinical Impression; Plan:     Hyperglycemia with urinary frequency.   Recent Results (from the past 12 hour(s))  -AMB POC URINALYSIS DIP STICK AUTO W/O MICRO:   Collection Time: 05/28/22  3:40 PM       Result                      Value             Ref Range           Color (UA POC) Yellow                                Clarity (UA POC)                                              Slightly Cloudy       Glucose (UA POC)            3+                Negative            Bilirubin (UA POC)          Negative          Negative            Ketones (UA POC)            2+                Negative            Specific gravity (UA P*     1.020             1.001 - 1.035       Blood (UA POC)              Negative          Negative            pH (UA POC)                 5.5               4.6 - 8.0           Protein (UA POC)            Negative          Negative            Urobilinogen (UA POC)       0.2 mg/dL         0.2 - 1             Nitrites (UA POC)           Negative          Negative            Leukocyte esterase (UA*     Negative          Negative       Poor glycemic control. Educated patient that insulin is indicated given her level of poor glycemic control. Metformin insufficient. Recommend endocrine evaluation. Follow up with PCP next week. Orders Placed This Encounter      CULTURE, URINE          Standing Status: Future          Standing Expiration Date: 5/28/2023      AMB POC URINALYSIS DIP STICK AUTO W/O MICRO      SITagliptin (JANUVIA) 50 mg tablet          Sig: Take 1 Tablet by mouth daily. Dispense:  10 Tablet          Refill:  0    Discussed that sitagliptin is temporizing, but not a permanent solution. Reviewed risks of end organ damage. Reviewed risks of prolonged hyperglycemia. Urine culture sent to verify, but no clinical signs of UTI on urinalysis. The patients condition was discussed with the patient and they understand. The patient is to follow up with PCP. If signs and symptoms become worse the pt is to go to the ER. The patient is to take medications as prescribed.          Procedures

## 2022-05-29 LAB
BACTERIA SPEC CULT: NORMAL
SERVICE CMNT-IMP: NORMAL

## 2022-05-31 ENCOUNTER — OFFICE VISIT (OUTPATIENT)
Dept: ENDOCRINOLOGY | Age: 52
End: 2022-05-31
Payer: COMMERCIAL

## 2022-05-31 VITALS
DIASTOLIC BLOOD PRESSURE: 75 MMHG | HEIGHT: 61 IN | WEIGHT: 158 LBS | BODY MASS INDEX: 29.83 KG/M2 | SYSTOLIC BLOOD PRESSURE: 128 MMHG | HEART RATE: 81 BPM

## 2022-05-31 DIAGNOSIS — E78.2 MIXED HYPERLIPIDEMIA: ICD-10-CM

## 2022-05-31 DIAGNOSIS — E11.21 TYPE 2 DIABETES MELLITUS WITH DIABETIC NEPHROPATHY, WITHOUT LONG-TERM CURRENT USE OF INSULIN (HCC): Primary | ICD-10-CM

## 2022-05-31 PROCEDURE — 99205 OFFICE O/P NEW HI 60 MIN: CPT | Performed by: INTERNAL MEDICINE

## 2022-05-31 RX ORDER — INSULIN GLARGINE 100 [IU]/ML
INJECTION, SOLUTION SUBCUTANEOUS
Qty: 15 ML | Refills: 3 | Status: SHIPPED | OUTPATIENT
Start: 2022-05-31

## 2022-05-31 RX ORDER — PEN NEEDLE, DIABETIC 31 GX3/16"
NEEDLE, DISPOSABLE MISCELLANEOUS
Qty: 100 PEN NEEDLE | Refills: 3 | Status: SHIPPED | OUTPATIENT
Start: 2022-05-31

## 2022-05-31 NOTE — LETTER
5/31/2022    Patient: Makayla Melgar   YOB: 1970   Date of Visit: 5/31/2022     Tina Avery MD  [address] [place] [address]   [address] [facility] [id number]  [address] [place]    Dear Tina Avery MD,      Thank you for referring Ms. Mary Grace Hernandez to [facility] for evaluation. My notes for this consultation are attached. If you have questions, please do not hesitate to call me. I look forward to following your patient along with you.       Sincerely,    Anila Day MD

## 2022-05-31 NOTE — PROGRESS NOTES
Chief Complaint   Patient presents with    Diabetes     pcp and pharmacy verified     History of Present Illness: Jesus Manriquez is a 50 y.o. female who I was asked to see in consult by Dr. Shahbaz Wellington for evaluation of diabetes. She was diagnosed with diabetes in 2019. Pt notes that in  she had GDM. Current regimen is Metformin XR 2000mg HS. She has been non-compliant with multiple DM agents. She has not been testing her BGs. \"I have the meter but I have not been testing\". Most recent Hgb A1c was 12.2% in 2022. Last week she was having issues of polyuria and polydipsia because her BGs were so high. \"The urgent care doctor said I had to get my sugars taken care of and she recommended I see an Endocrinologist.\"    A typical day is as follows:  Pt wakes around 445AM  - She has breakfast around 630-645AM, this AM she had egg whites, coffee (black) and lemon water. - She will have a mid-morning snack of peanuts and glazed pecans  - She has lunch around 1130AM-130PM, yesterday she had steak, with a tortilla, cheese, salsa and water. - She will have an afternoon snack of popcorn  - She has dinner around 7PM, last night she had shrimp, tomatoes, a slice of bread and water. - She will occasionally have a snack in the evening of brownies    Exercise consists of \"not much\". No history of vascular disease. Her father had DM, HTN and CVA. She has urine proteinuria in 2022. Last eye exam was 2022, \"they did not see any retinopathy. \" Will request the records from [facility]. Pt notes she has seen a DM nutritionist in the past \"but it is so hard to follow the low carb diet. \"    Past Medical History:   Diagnosis Date    Diabetes mellitus     Hyperlipidemia 2015     Past Surgical History:   Procedure Laterality Date    HX GYN            Current Outpatient Medications   Medication Sig    semaglutide (OZEMPIC) 0.25 mg or 0.5 mg/dose (2 mg/1.5 ml) subq pen 0.5 mg by SubCUTAneous route every seven (7) days.  insulin glargine (LANTUS,BASAGLAR) 100 unit/mL (3 mL) inpn 10 units every day    Insulin Needles, Disposable, (Oanh Pen Needle) 32 gauge x 5/32\" ndle One shot daily with the Lantus (dispense whatever pen needle the insurance prefers)    glucose blood VI test strips (OneTouch Ultra Test) strip TEST BLOOD SUGAR DAILY    rosuvastatin (CRESTOR) 40 mg tablet Take 1 Tablet by mouth nightly.  metFORMIN ER (GLUCOPHAGE XR) 500 mg tablet TAKE 4 TABLETS BY MOUTH DAILY WITH DINNER    Blood-Glucose Meter monitoring kit Pharmacist to select based on patients insurance. DX E11.9    lancets (Lancets,Ultra Thin) misc CHECK BLOOD SUGAR AS DIRECTED    acetaminophen (TYLENOL) 325 mg tablet Take  by mouth every four (4) hours as needed for Pain.  glucose blood VI test strips (BLOOD GLUCOSE TEST) strip Pharmacist to choose preferred meter and strips. Dx:    fluticasone (FLONASE) 50 mcg/actuation nasal spray 2 Sprays by Both Nostrils route daily.  coenzyme q10-vitamin e (COQ10 ) 100-100 mg-unit cap Take 2 Caps by mouth daily.  multivitamin (ONE A DAY) tablet Take 1 Tab by mouth daily.  naproxen sodium (ALEVE) 220 mg cap Take 1 Cap by mouth daily as needed.  Cetirizine (ZYRTEC) 10 mg cap Take 1 Capsule by mouth daily as needed. No current facility-administered medications for this visit.      No Known Allergies  Family History   Problem Relation Age of Onset    Cancer Mother         lung    Hypertension Father     Diabetes Father     Stroke Father     Cancer Maternal Grandfather         lung    No Known Problems Brother     No Known Problems Maternal Grandmother     No Known Problems Paternal Grandmother     Hypertension Paternal Grandfather     Diabetes Paternal Aunt      Social History     Socioeconomic History    Marital status:      Spouse name: Not on file    Number of children: Not on file    Years of education: Not on file    Highest education level: Not on file   Occupational History    Occupation:      Employer: [employer]   Tobacco Use    Smoking status: Never Smoker    Smokeless tobacco: Never Used   Vaping Use    Vaping Use: Never used   Substance and Sexual Activity    Alcohol use: Yes     Comment: rarely    Drug use: No    Sexual activity: Yes     Partners: Male     Birth control/protection: None     Comment: single   Other Topics Concern    Not on file   Social History Narrative    Not on file     Social Determinants of Health     Financial Resource Strain:     Difficulty of Paying Living Expenses: Not on file   Food Insecurity:     Worried About Running Out of Food in the Last Year: Not on file    [place] of Food in the Last Year: Not on file   Transportation Needs:     Lack of Transportation (Medical): Not on file    Lack of Transportation (Non-Medical):  Not on file   Physical Activity:     Days of Exercise per Week: Not on file    Minutes of Exercise per Session: Not on file   Stress:     Feeling of Stress : Not on file   Social Connections:     Frequency of Communication with Friends and Family: Not on file    Frequency of Social Gatherings with Friends and Family: Not on file    Attends Bahai Services: Not on file    Active Member of [address] [place] [address] or Organizations: Not on file    Attends Club or Organization Meetings: Not on file    Marital Status: Not on file   Intimate Partner Violence:     Fear of Current or Ex-Partner: Not on file    Emotionally Abused: Not on file    Physically Abused: Not on file    Sexually Abused: Not on file   Housing Stability:     Unable to Pay for Housing in the Last Year: Not on file    Number of [place] in the Last Year: Not on file    Unstable Housing in the Last Year: Not on file     Review of Systems:  - Constitutional Symptoms: no fevers, chills, weight loss  - Eyes: no blurry vision or double vision  - Cardiovascular: no chest pain or palpitations  - Respiratory: no cough or shortness of breath  - Gastrointestinal: no dysphagia or abdominal pain  - Musculoskeletal: no joint pains or weakness  - Integumentary: no rashes  - Neurological: no numbness, tingling, or headaches  - Psychiatric: no depression or anxiety  - Endocrine: no heat or cold intolerance, + polyuria and polydipsia    Physical Examination:  Blood pressure 128/75, pulse 81, height 5' 1\" (1.549 m), weight 158 lb (71.7 kg).   - General: pleasant, no distress, good eye contact  - HEENT: no exopthalmos, no periorbital edema, no scleral/conjunctival injection, EOMI, no lid lag or stare  - Neck: supple, no thyromegaly, masses, lymph nodes, or carotid bruits, no supraclavicular or dorsocervical fat pads  - Cardiovascular: regular, normal rate, normal S1 and S2, no murmurs/rubs/gallops, 2+ dorsalis pedis pulses bilaterally  - Respiratory: clear to auscultation bilaterally  - Gastrointestinal: soft, nontender, nondistended, no masses, no hepatosplenomegaly  - Musculoskeletal: no proximal muscle weakness in upper or lower extremities  - Integumentary: no acanthosis nigricans, no rashes, no edema, no foot ulcers  - Neurological: DTR 2+, no tremors  - Psychiatric: normal mood and affect    Diabetic foot exam:     Left Foot:   Visual Exam: normal    Pulse DP: 2+ (normal)   Filament test: normal sensation    Vibratory sensation: normal      Right Foot:   Visual Exam: normal    Pulse DP: 2+ (normal)   Filament test: normal sensation    Vibratory sensation: normal        Data Reviewed:   Component      Latest Ref Rng & Units 3/24/2022   WBC      3.6 - 11.0 K/uL 6.6   RBC      3.80 - 5.20 M/uL 4.94   HGB      11.5 - 16.0 g/dL 13.5   HCT      35.0 - 47.0 % 41.3   MCV      80.0 - 99.0 FL 83.6   MCH      26.0 - 34.0 PG 27.3   MCHC      30.0 - 36.5 g/dL 32.7   RDW      11.5 - 14.5 % 12.6   PLATELET      364 - 611 K/uL 268   MPV      8.9 - 12.9 FL 9.8   NRBC      0  WBC 0.0   ABSOLUTE NRBC      0.00 - 0.01 K/uL 0.00   NEUTROPHILS      32 - 75 % 48   LYMPHOCYTES      12 - 49 % 41   MONOCYTES      5 - 13 % 6   EOSINOPHILS      0 - 7 % 4   BASOPHILS      0 - 1 % 1   IMMATURE GRANULOCYTES      0.0 - 0.5 % 0   ABS. NEUTROPHILS      1.8 - 8.0 K/UL 3.2   ABS. LYMPHOCYTES      0.8 - 3.5 K/UL 2.7   ABS. MONOCYTES      0.0 - 1.0 K/UL 0.4   ABS. EOSINOPHILS      0.0 - 0.4 K/UL 0.3   ABS. BASOPHILS      0.0 - 0.1 K/UL 0.1   ABS. IMM. GRANS.      0.00 - 0.04 K/UL 0.0   DF       AUTOMATED   Sodium      136 - 145 mmol/L 132 (L)   Potassium      3.5 - 5.1 mmol/L 3.9   Chloride      97 - 108 mmol/L 99   CO2      21 - 32 mmol/L 24   Anion gap      5 - 15 mmol/L 9   Glucose      65 - 100 mg/dL 261 (H)   BUN      6 - 20 MG/DL 16   Creatinine      0.55 - 1.02 MG/DL 0.68   BUN/Creatinine ratio      12 - 20   24 (H)   GFR est AA      >60 ml/min/1.73m2 >60   GFR est non-AA      >60 ml/min/1.73m2 >60   Calcium      8.5 - 10.1 MG/DL 8.7   Bilirubin, total      0.2 - 1.0 MG/DL 0.7   ALT      12 - 78 U/L 42   AST      15 - 37 U/L 14 (L)   Alk. phosphatase      45 - 117 U/L 78   Protein, total      6.4 - 8.2 g/dL 7.0   Albumin      3.5 - 5.0 g/dL 4.3   Globulin      2.0 - 4.0 g/dL 2.7   A-G Ratio      1.1 - 2.2   1.6   Cholesterol, total      <200 MG/DL 94   Triglyceride      <150 MG/   HDL Cholesterol      MG/DL 45   LDL, calculated      0 - 100 MG/DL 24.2   VLDL, calculated      MG/DL 24.8   CHOL/HDL Ratio      0.0 - 5.0   2.1   Microalbumin,urine random      MG/DL 6.93   Creatinine, urine      mg/dL 55.20   Microalbumin/Creat. Ratio      0 - 30 mg/g 126 (H)   Hemoglobin A1c, (calculated)      4.0 - 5.6 % 12.2 (H)   Est. average glucose      mg/dL 303       Assessment/Plan:   1. Type 2 diabetes mellitus with diabetic nephropathy, without long-term current use of insulin ([address]    2.  Mixed hyperlipidemia    1) Pt given copy of \"Daily Diabetes Meal Planning Guide\" and educated about the \"[place] dinner plate\", reading food labels and which foods have the highest carbohydrates. Pt instructed to limit her carbohydrates to 30-45 grams with meals and 15 grams or less with snacks (but to limit snacks). We also discussed the importance of increasing her physical activity to help with improving BGs. We discussed glucotoxicity and how staring insulin is often needed to get very high BGs down and once the BGs improve we can stop the insulin later. Pt to continue the Metformin XR 2000mg daily. Will start pt on Lantus 10 units daily and Ozempic 0.5mg weekly. We discussed the mechanism of action of the GLP-1 agents and the risk vs benefits, including Nausea, pancreatitis and hypoglycemia. Pt given written information about Ozempic. Pt to test her BGs daily and send me her BG readings in 6 weeks. She has already developed proteinuria as a side effect of the DM. 2) Her lipid panel was at goal in March 2022. Pt to continue the Rosuvastatin 40mg daily. Pt voices understanding and agreement with the plan. RTC 4 months    Patient Instructions   1) Ozempic (Once per week injection that is not an insulin). Start by injecting 0.25mg every week for 4 weeks, then after 4 weeks, increase to 0.5mg every week. 2) Lantus (an every day insulin injection) Give yourself 10 units every morning. 3) Test your blood sugar every day, once per day. Learning About Carbohydrate (Carb) Counting and Eating Out When You Have Diabetes  Why plan your meals? Meal planning can be a key part of managing diabetes. Planning meals and snacks with the right balance of carbohydrate, protein, and fat can help you keep your blood sugar at the target level you set with your doctor. You don't have to eat special foods. You can eat what your family eats, including sweets once in a while. But you do have to pay attention to how often you eat and how much you eat of certain foods. You may want to work with a dietitian or a diabetes educator.  They can give you tips and meal ideas and can answer your questions about meal planning. This health professional can also help you reach a healthy weight if that is one of your goals. What should you know about eating carbs? Managing the amount of carbohydrate (carbs) you eat is an important part of healthy meals when you have diabetes. Carbohydrate is found in many foods. · Learn which foods have carbs. And learn the amounts of carbs in different foods. ? Bread, cereal, pasta, and rice have about 15 grams of carbs in a serving. A serving is 1 slice of bread (1 ounce), ½ cup of cooked cereal, or 1/3 cup of cooked pasta or rice. ? Fruits have 15 grams of carbs in a serving. A serving is 1 small fresh fruit, such as an apple or orange; ½ of a banana; ½ cup of cooked or canned fruit; ½ cup of fruit juice; 1 cup of melon or raspberries; or 2 tablespoons of dried fruit. ? Milk and no-sugar-added yogurt have 15 grams of carbs in a serving. A serving is 1 cup of milk or 3/4 cup (6 oz) of no-sugar-added yogurt. ? Starchy vegetables have 15 grams of carbs in a serving. A serving is ½ cup of mashed potatoes or sweet potato; 1 cup winter squash; ½ of a small baked potato; ½ cup of cooked beans; or ½ cup cooked corn or green peas. · Learn how much carbs to eat each day and at each meal. A dietitian or certified diabetes educator can teach you how to keep track of the amount of carbs you eat. This is called carbohydrate counting. · If you are not sure how to count carbohydrate grams, use the plate method to plan meals. It is a quick way to make sure that you have a balanced meal. It also can help you manage the amount of carbohydrate you eat at meals. ? Divide your plate by types of foods. Put non-starchy vegetables on half the plate, meat or other protein food on one-quarter of the plate, and a grain or starchy vegetable in the final quarter of the plate.  To this you can add a small piece of fruit and 1 cup of milk or yogurt, depending on how many carbs you are supposed to eat at a meal.  · Try to eat about the same amount of carbs at each meal. Do not \"save up\" your daily allowance of carbs to eat at one meal.  · Proteins have very little or no carbs. Examples of proteins are beef, chicken, turkey, fish, eggs, tofu, cheese, cottage cheese, and peanut butter. How can you eat out and still eat healthy? · Learn to estimate the serving sizes of foods that have carbohydrate. If you measure food at home, it will be easier to estimate the amount in a serving of restaurant food. · If the meal you order has too much carbohydrate (such as potatoes, corn, or baked beans), ask to have a low-carbohydrate food instead. Ask for a salad or non-starchy vegetables like broccoli, cauliflower, green beans, or peppers. · If you eat more carbohydrate at a meal than you had planned, take a walk or do other exercise. This will help lower your blood sugar. What are some tips for eating healthy? · Limit saturated fat, such as the fat from meat and dairy products. This is a healthy choice because people who have diabetes are at higher risk of heart disease. So choose lean cuts of meat and nonfat or low-fat dairy products. Use olive or canola oil instead of butter or shortening when cooking. · Don't skip meals. Your blood sugar may drop too low if you skip meals and take insulin or certain medicines for diabetes. · Check with your doctor before you drink alcohol. Alcohol can cause your blood sugar to drop too low. Alcohol can also cause a bad reaction if you take certain diabetes medicines. Follow-up care is a key part of your treatment and safety. Be sure to make and go to all appointments, and call your doctor if you are having problems. It's also a good idea to know your test results and keep a list of the medicines you take. Where can you learn more?   Go to http://www.gray.com/  Enter I147 in the search box to learn more about \"Learning About Carbohydrate (Carb) Counting and Eating Out When You Have Diabetes. \"  Current as of: September 8, 2021               Content Version: 13.2  © 2006-2022 [employer]. Care instructions adapted under license by [employer] (which disclaims liability or warranty for this information). If you have questions about a medical condition or this instruction, always ask your healthcare professional. [address] any warranty or liability for your use of this information. Ozempic  When This Medicine Should Not Be Used: This medicine is not right for everyone. Do not use it if you had an allergic reaction to dulaglutide, you have multiple endocrine neoplasia syndrome type 2 (MEN 2), or you or anyone in your family has had medullary thyroid cancer. How to Use This Medicine:   Injectable  · Your doctor will prescribe your exact dose. This medicine is usually given once a week, on the same day of the week. It is given as a shot under the skin of your stomach, thighs, or upper arms. · You may be taught how to give your medicine at home. Make sure you understand all instructions before giving yourself an injection. Do not use more medicine or use it more often than your doctor tells you to. · If you use insulin in addition to this medicine, do not mix them in the same syringe. You may give the shots in the same area (such as your stomach), but do not give the shots right next to each other. · If the medicine in the pen or prefilled syringe has changed color, looks cloudy, or has particles in it, do not use it. · You will be shown the body areas where this shot can be given. Use a different body area each time you give yourself a shot. Keep track of where you give each shot to make sure you rotate body areas. · Use a new needle and syringe each time you inject your medicine. · This medicine should come with a Medication Guide.  Ask your pharmacist for a copy if you do not have one. · Missed dose: Use a missed dose as soon as possible if there are at least 3 days before your next dose is due. If your next dose is due in less than 3 days, then skip the missed dose. · Store your medicine pens or prefilled syringes in the refrigerator and keep them in the original carton. Protect the pens from light. You may also store the pens at room temperature for up to 14 days. Do not freeze the medicine, and do not use the medicine if it has been frozen. Drugs and Foods to Avoid:      Ask your doctor or pharmacist before using any other medicine, including over-the-counter medicines, vitamins, and herbal products. Warnings While Using This Medicine:   · Tell your doctor if you are pregnant or breastfeeding, or if you have kidney disease or a history of pancreas problems. Tell your doctor if you have severe digestion problems (such as gastroparesis) or stomach or bowel problems. · This medicine may cause the following problems:  ¨ Increased risk of thyroid tumor  ¨ Pancreatitis  ¨ Low blood sugar (more likely if you also take insulin or other diabetes medicine)  · Your doctor will do lab tests at regular visits to check on the effects of this medicine. Keep all appointments. · Keep all medicine out of the reach of children. Never share your medicine with anyone. Do not share needles or pens because you can spread an infection.   Possible Side Effects While Using This Medicine:   Call your doctor right away if you notice any of these side effects:  · Allergic reaction: Itching or hives, swelling in your face or hands, swelling or tingling in your mouth or throat, chest tightness, trouble breathing  · A lump or swelling in your neck, trouble breathing or swallowing  · Change in how much or how often you urinate  · Shaking, trembling, sweating, fast or pounding heartbeat, lightheadedness, hunger, confusion  · Sudden and severe stomach pain, nausea, vomiting, fever, and lightheadedness  If you notice these less serious side effects, talk with your doctor:   · Diarrhea  · Redness, itching, swelling, or any changes in your skin where the shot was given  If you notice other side effects that you think are caused by this medicine, tell your doctor. Call your doctor for medical advice about side effects. You may report side effects to FDA at 8-105-SIC-3118  © 2017 [facility] Information is for End User's use only and may not be sold, redistributed or otherwise used for commercial purposes. The above information is an  only. It is not intended as medical advice for individual conditions or treatments. Talk to your doctor, nurse or pharmacist before following any medical regimen to see if it is safe and effective for you. Follow-up and Dispositions    · Return in about 4 months (around 9/30/2022).          Copy sent to:  Dr. Eliza Ordonez

## 2022-05-31 NOTE — PATIENT INSTRUCTIONS
1) Ozempic (Once per week injection that is not an insulin). Start by injecting 0.25mg every week for 4 weeks, then after 4 weeks, increase to 0.5mg every week. 2) Lantus (an every day insulin injection) Give yourself 10 units every morning. 3) Test your blood sugar every day, once per day. Learning About Carbohydrate (Carb) Counting and Eating Out When You Have Diabetes  Why plan your meals? Meal planning can be a key part of managing diabetes. Planning meals and snacks with the right balance of carbohydrate, protein, and fat can help you keep your blood sugar at the target level you set with your doctor. You don't have to eat special foods. You can eat what your family eats, including sweets once in a while. But you do have to pay attention to how often you eat and how much you eat of certain foods. You may want to work with a dietitian or a diabetes educator. They can give you tips and meal ideas and can answer your questions about meal planning. This health professional can also help you reach a healthy weight if that is one of your goals. What should you know about eating carbs? Managing the amount of carbohydrate (carbs) you eat is an important part of healthy meals when you have diabetes. Carbohydrate is found in many foods. · Learn which foods have carbs. And learn the amounts of carbs in different foods. ? Bread, cereal, pasta, and rice have about 15 grams of carbs in a serving. A serving is 1 slice of bread (1 ounce), ½ cup of cooked cereal, or 1/3 cup of cooked pasta or rice. ? Fruits have 15 grams of carbs in a serving. A serving is 1 small fresh fruit, such as an apple or orange; ½ of a banana; ½ cup of cooked or canned fruit; ½ cup of fruit juice; 1 cup of melon or raspberries; or 2 tablespoons of dried fruit. ? Milk and no-sugar-added yogurt have 15 grams of carbs in a serving. A serving is 1 cup of milk or 3/4 cup (6 oz) of no-sugar-added yogurt.   ? Starchy vegetables have 15 grams of carbs in a serving. A serving is ½ cup of mashed potatoes or sweet potato; 1 cup winter squash; ½ of a small baked potato; ½ cup of cooked beans; or ½ cup cooked corn or green peas. · Learn how much carbs to eat each day and at each meal. A dietitian or certified diabetes educator can teach you how to keep track of the amount of carbs you eat. This is called carbohydrate counting. · If you are not sure how to count carbohydrate grams, use the plate method to plan meals. It is a quick way to make sure that you have a balanced meal. It also can help you manage the amount of carbohydrate you eat at meals. ? Divide your plate by types of foods. Put non-starchy vegetables on half the plate, meat or other protein food on one-quarter of the plate, and a grain or starchy vegetable in the final quarter of the plate. To this you can add a small piece of fruit and 1 cup of milk or yogurt, depending on how many carbs you are supposed to eat at a meal.  · Try to eat about the same amount of carbs at each meal. Do not \"save up\" your daily allowance of carbs to eat at one meal.  · Proteins have very little or no carbs. Examples of proteins are beef, chicken, turkey, fish, eggs, tofu, cheese, cottage cheese, and peanut butter. How can you eat out and still eat healthy? · Learn to estimate the serving sizes of foods that have carbohydrate. If you measure food at home, it will be easier to estimate the amount in a serving of restaurant food. · If the meal you order has too much carbohydrate (such as potatoes, corn, or baked beans), ask to have a low-carbohydrate food instead. Ask for a salad or non-starchy vegetables like broccoli, cauliflower, green beans, or peppers. · If you eat more carbohydrate at a meal than you had planned, take a walk or do other exercise. This will help lower your blood sugar. What are some tips for eating healthy? · Limit saturated fat, such as the fat from meat and dairy products.  This is a healthy choice because people who have diabetes are at higher risk of heart disease. So choose lean cuts of meat and nonfat or low-fat dairy products. Use olive or canola oil instead of butter or shortening when cooking. · Don't skip meals. Your blood sugar may drop too low if you skip meals and take insulin or certain medicines for diabetes. · Check with your doctor before you drink alcohol. Alcohol can cause your blood sugar to drop too low. Alcohol can also cause a bad reaction if you take certain diabetes medicines. Follow-up care is a key part of your treatment and safety. Be sure to make and go to all appointments, and call your doctor if you are having problems. It's also a good idea to know your test results and keep a list of the medicines you take. Where can you learn more? Go to http://www.marie.com/  Enter I147 in the search box to learn more about \"Learning About Carbohydrate (Carb) Counting and Eating Out When You Have Diabetes. \"  Current as of: September 8, 2021               Content Version: 13.2  © 2006-2022 [employer]. Care instructions adapted under license by [employer] (which disclaims liability or warranty for this information). If you have questions about a medical condition or this instruction, always ask your healthcare professional. [address] any warranty or liability for your use of this information. Ozempic  When This Medicine Should Not Be Used: This medicine is not right for everyone. Do not use it if you had an allergic reaction to dulaglutide, you have multiple endocrine neoplasia syndrome type 2 (MEN 2), or you or anyone in your family has had medullary thyroid cancer. How to Use This Medicine:   Injectable  · Your doctor will prescribe your exact dose. This medicine is usually given once a week, on the same day of the week.  It is given as a shot under the skin of your stomach, thighs, or upper arms. · You may be taught how to give your medicine at home. Make sure you understand all instructions before giving yourself an injection. Do not use more medicine or use it more often than your doctor tells you to. · If you use insulin in addition to this medicine, do not mix them in the same syringe. You may give the shots in the same area (such as your stomach), but do not give the shots right next to each other. · If the medicine in the pen or prefilled syringe has changed color, looks cloudy, or has particles in it, do not use it. · You will be shown the body areas where this shot can be given. Use a different body area each time you give yourself a shot. Keep track of where you give each shot to make sure you rotate body areas. · Use a new needle and syringe each time you inject your medicine. · This medicine should come with a Medication Guide. Ask your pharmacist for a copy if you do not have one. · Missed dose: Use a missed dose as soon as possible if there are at least 3 days before your next dose is due. If your next dose is due in less than 3 days, then skip the missed dose. · Store your medicine pens or prefilled syringes in the refrigerator and keep them in the original carton. Protect the pens from light. You may also store the pens at room temperature for up to 14 days. Do not freeze the medicine, and do not use the medicine if it has been frozen. Drugs and Foods to Avoid:      Ask your doctor or pharmacist before using any other medicine, including over-the-counter medicines, vitamins, and herbal products. Warnings While Using This Medicine:   · Tell your doctor if you are pregnant or breastfeeding, or if you have kidney disease or a history of pancreas problems. Tell your doctor if you have severe digestion problems (such as gastroparesis) or stomach or bowel problems.   · This medicine may cause the following problems:  ¨ Increased risk of thyroid tumor  ¨ Pancreatitis  ¨ Low blood sugar (more likely if you also take insulin or other diabetes medicine)  · Your doctor will do lab tests at regular visits to check on the effects of this medicine. Keep all appointments. · Keep all medicine out of the reach of children. Never share your medicine with anyone. Do not share needles or pens because you can spread an infection. Possible Side Effects While Using This Medicine:   Call your doctor right away if you notice any of these side effects:  · Allergic reaction: Itching or hives, swelling in your face or hands, swelling or tingling in your mouth or throat, chest tightness, trouble breathing  · A lump or swelling in your neck, trouble breathing or swallowing  · Change in how much or how often you urinate  · Shaking, trembling, sweating, fast or pounding heartbeat, lightheadedness, hunger, confusion  · Sudden and severe stomach pain, nausea, vomiting, fever, and lightheadedness  If you notice these less serious side effects, talk with your doctor:   · Diarrhea  · Redness, itching, swelling, or any changes in your skin where the shot was given  If you notice other side effects that you think are caused by this medicine, tell your doctor. Call your doctor for medical advice about side effects. You may report side effects to FDA at 9-632-FDA-8744  © 2017 [facility] Information is for End User's use only and may not be sold, redistributed or otherwise used for commercial purposes. The above information is an  only. It is not intended as medical advice for individual conditions or treatments. Talk to your doctor, nurse or pharmacist before following any medical regimen to see if it is safe and effective for you.

## 2022-06-03 ENCOUNTER — OFFICE VISIT (OUTPATIENT)
Dept: URGENT CARE | Age: 52
End: 2022-06-03
Payer: COMMERCIAL

## 2022-06-03 VITALS — TEMPERATURE: 97.3 F | HEART RATE: 81 BPM | RESPIRATION RATE: 16 BRPM | OXYGEN SATURATION: 99 %

## 2022-06-03 DIAGNOSIS — Z20.822 ENCOUNTER FOR LABORATORY TESTING FOR COVID-19 VIRUS: Primary | ICD-10-CM

## 2022-06-03 LAB — SARS-COV-2 PCR, POC: NEGATIVE

## 2022-06-03 PROCEDURE — 87635 SARS-COV-2 COVID-19 AMP PRB: CPT | Performed by: FAMILY MEDICINE

## 2022-06-03 PROCEDURE — 99211 OFF/OP EST MAY X REQ PHY/QHP: CPT | Performed by: FAMILY MEDICINE

## 2022-08-04 ENCOUNTER — TELEPHONE (OUTPATIENT)
Dept: INTERNAL MEDICINE CLINIC | Age: 52
End: 2022-08-04

## 2022-08-04 DIAGNOSIS — E11.9 TYPE 2 DIABETES MELLITUS WITHOUT COMPLICATION, WITH LONG-TERM CURRENT USE OF INSULIN (HCC): ICD-10-CM

## 2022-08-04 DIAGNOSIS — E11.9 CONTROLLED TYPE 2 DIABETES MELLITUS WITHOUT COMPLICATION, WITHOUT LONG-TERM CURRENT USE OF INSULIN (HCC): ICD-10-CM

## 2022-08-04 DIAGNOSIS — E11.9 CONTROLLED TYPE 2 DIABETES MELLITUS WITHOUT COMPLICATION, WITHOUT LONG-TERM CURRENT USE OF INSULIN (HCC): Primary | ICD-10-CM

## 2022-08-04 DIAGNOSIS — Z79.4 TYPE 2 DIABETES MELLITUS WITHOUT COMPLICATION, WITH LONG-TERM CURRENT USE OF INSULIN (HCC): ICD-10-CM

## 2022-08-04 RX ORDER — BLOOD SUGAR DIAGNOSTIC
STRIP MISCELLANEOUS
Qty: 100 STRIP | Refills: 3 | Status: SHIPPED | OUTPATIENT
Start: 2022-08-04

## 2022-08-04 RX ORDER — METFORMIN HYDROCHLORIDE 500 MG/1
TABLET, EXTENDED RELEASE ORAL
Qty: 360 TABLET | Refills: 1 | Status: SHIPPED | OUTPATIENT
Start: 2022-08-04

## 2022-08-04 RX ORDER — ROSUVASTATIN CALCIUM 40 MG/1
40 TABLET, COATED ORAL
Qty: 90 TABLET | Refills: 1 | OUTPATIENT
Start: 2022-08-04

## 2022-11-01 ENCOUNTER — PATIENT OUTREACH (OUTPATIENT)
Dept: CASE MANAGEMENT | Age: 52
End: 2022-11-01

## 2023-01-11 ENCOUNTER — E-VISIT (OUTPATIENT)
Dept: INTERNAL MEDICINE CLINIC | Age: 53
End: 2023-01-11
Payer: COMMERCIAL

## 2023-01-11 DIAGNOSIS — U07.1 COVID-19: Primary | ICD-10-CM

## 2023-01-11 PROCEDURE — 99421 OL DIG E/M SVC 5-10 MIN: CPT | Performed by: INTERNAL MEDICINE

## 2023-01-11 NOTE — PROGRESS NOTES
E-Visit Note:    HPI: per patient questionnaire, this has been reviewed  EXAM: n/a    ----------------------------------  Diagnoses and all orders for this visit:    1. COVID-19     PLAN:   See Pathflow message to confirm the date her sx started. ----------------------------------  The patient was advised to call if these symptoms worsen or fail to improve as anticipated. 5-10 minutes were spent on the digital evaluation and management of this patient.        Angel Sanchez MD

## 2023-01-30 ENCOUNTER — TELEPHONE (OUTPATIENT)
Dept: INTERNAL MEDICINE CLINIC | Age: 53
End: 2023-01-30

## 2023-02-22 ENCOUNTER — OFFICE VISIT (OUTPATIENT)
Dept: INTERNAL MEDICINE CLINIC | Age: 53
End: 2023-02-22
Payer: COMMERCIAL

## 2023-02-22 VITALS
HEIGHT: 61 IN | HEART RATE: 76 BPM | WEIGHT: 163 LBS | OXYGEN SATURATION: 99 % | TEMPERATURE: 97.6 F | RESPIRATION RATE: 12 BRPM | BODY MASS INDEX: 30.78 KG/M2 | DIASTOLIC BLOOD PRESSURE: 86 MMHG | SYSTOLIC BLOOD PRESSURE: 133 MMHG

## 2023-02-22 DIAGNOSIS — Z12.39 ENCOUNTER FOR SCREENING FOR MALIGNANT NEOPLASM OF BREAST, UNSPECIFIED SCREENING MODALITY: ICD-10-CM

## 2023-02-22 DIAGNOSIS — E11.9 TYPE 2 DIABETES MELLITUS WITHOUT COMPLICATION, WITH LONG-TERM CURRENT USE OF INSULIN (HCC): ICD-10-CM

## 2023-02-22 DIAGNOSIS — E78.5 HYPERLIPIDEMIA, UNSPECIFIED HYPERLIPIDEMIA TYPE: Primary | ICD-10-CM

## 2023-02-22 DIAGNOSIS — E11.21 TYPE 2 DIABETES MELLITUS WITH DIABETIC NEPHROPATHY, WITHOUT LONG-TERM CURRENT USE OF INSULIN (HCC): ICD-10-CM

## 2023-02-22 DIAGNOSIS — Z79.4 TYPE 2 DIABETES MELLITUS WITHOUT COMPLICATION, WITH LONG-TERM CURRENT USE OF INSULIN (HCC): ICD-10-CM

## 2023-02-22 DIAGNOSIS — Z12.11 ENCOUNTER FOR COLORECTAL CANCER SCREENING: ICD-10-CM

## 2023-02-22 DIAGNOSIS — Z12.12 ENCOUNTER FOR COLORECTAL CANCER SCREENING: ICD-10-CM

## 2023-02-22 PROCEDURE — 99214 OFFICE O/P EST MOD 30 MIN: CPT | Performed by: INTERNAL MEDICINE

## 2023-02-22 RX ORDER — IBUPROFEN 200 MG
CAPSULE ORAL
Qty: 100 STRIP | Refills: 3 | OUTPATIENT
Start: 2023-02-22

## 2023-02-22 RX ORDER — ROSUVASTATIN CALCIUM 40 MG/1
40 TABLET, COATED ORAL
Qty: 90 TABLET | Refills: 1 | Status: SHIPPED | OUTPATIENT
Start: 2023-02-22

## 2023-02-22 RX ORDER — METFORMIN HYDROCHLORIDE 500 MG/1
TABLET, EXTENDED RELEASE ORAL
Qty: 360 TABLET | Refills: 1 | Status: SHIPPED | OUTPATIENT
Start: 2023-02-22

## 2023-02-22 NOTE — PROGRESS NOTES
HISTORY OF PRESENT ILLNESS  Ya Lima is a 48 y.o. female. HPI  Patient is a 47 yo patient of Felicia Gracia MD who returns today for follow up of her Type 2 diabetes. When she was here in 3/22 her diabetes was poorly controlled with Hgb A1C of 12.2. She subsequently saw Dr. Jessica Rosales, Endocrinology, and was started on Lantus 10 units every day and Semaglutide. The patient reports that she never took Ozempic or Lantus, as she was afraid to do the injections> She has not followed up until now. She has been checking her blood sugars bid, running 220+ fasting, 190's 2 hours after lunch. She has been taking her Glucophage. She reports that she  Saw her eye doc about 2 weeks ago and she was told that there was no retinopathy. She also has a history of proteinuria and hyperlipidemia, treated with Crestor    She has gained about 5 lbs since last year. Not exercising  Patient denies chest pain, palpitations, shortness of breath. She denies symptoms of neuropathy. She has had some insomnia since menopause, with difficulty falling asleep. She plans to try over the counter sleep aid      Patient Active Problem List   Diagnosis Code    Allergic rhinitis, seasonal J30.2    Diabetes mellitus type 2, controlled ([address] E11.9    Hyperlipidemia E78.5    Type 2 diabetes mellitus with diabetic nephropathy, without long-term current use of insulin ([facility] [address] E11.21     Current Outpatient Medications on File Prior to Visit   Medication Sig Dispense Refill    soy isofla/blk cohosh/mag bark (ESTROVEN PO) Take 1 Tablet by mouth daily. glucose blood VI test strips (OneTouch Ultra Test) strip TEST BLOOD SUGAR DAILY 100 Strip 3    Blood-Glucose Meter monitoring kit Pharmacist to select based on patients insurance.  DX E11.9 1 Kit 0    lancets (Lancets,Ultra Thin) misc CHECK BLOOD SUGAR AS DIRECTED 100 Each 3    acetaminophen (TYLENOL) 325 mg tablet Take  by mouth every four (4) hours as needed for Pain.      glucose blood VI test strips (BLOOD GLUCOSE TEST) strip Pharmacist to choose preferred meter and strips. Dx: 100 Strip 3    fluticasone (FLONASE) 50 mcg/actuation nasal spray 2 Sprays by Both Nostrils route daily. 1 Bottle 0    multivitamin (ONE A DAY) tablet Take 1 Tab by mouth daily. naproxen sodium 220 mg cap Take 1 Cap by mouth daily as needed. Cetirizine 10 mg cap Take 1 Capsule by mouth daily as needed. [DISCONTINUED] nirmatrelvir-ritonavir (Paxlovid, EUA,) 300 mg (150 mg x 2)-100 mg Nirmatrelvir 300 mg with ritonavir 100 mg, administered together twice daily for 5 days. 1 Box 0    [DISCONTINUED] rosuvastatin (CRESTOR) 40 mg tablet TAKE 1 TABLET BY MOUTH EVERY NIGHT 90 Tablet 1    [DISCONTINUED] metFORMIN ER (GLUCOPHAGE XR) 500 mg tablet TAKE 4 TABLETS BY MOUTH DAILY WITH DINNER 360 Tablet 1    semaglutide (OZEMPIC) 0.25 mg or 0.5 mg/dose (2 mg/1.5 ml) subq pen 0.5 mg by SubCUTAneous route every seven (7) days. 1 Box 3    insulin glargine (LANTUS,BASAGLAR) 100 unit/mL (3 mL) inpn 10 units every day 15 mL 3    [DISCONTINUED] Insulin Needles, Disposable, (Oanh Pen Needle) 32 gauge x 5/32\" ndle One shot daily with the Lantus (dispense whatever pen needle the insurance prefers) 100 Pen Needle 3    coenzyme q10-vitamin e (COQ10 ) 100-100 mg-unit cap Take 2 Caps by mouth daily. No current facility-administered medications on file prior to visit. ROS  Per HPI  Physical Exam  Visit Vitals  /86 (BP 1 Location: Left upper arm, BP Patient Position: Sitting, BP Cuff Size: Large adult)   Pulse 76   Temp 97.6 °F (36.4 °C) (Oral)   Resp 12   Ht 5' 1\" (1.549 m)   Wt 163 lb (73.9 kg)   LMP  (LMP Unknown)   SpO2 99%   BMI 30.80 kg/m²     Patient appears well  Neck: supple, without adenopathy or thyromegaly, carotids without bruits  Heart[de-identified] normal rate, regular rhythm, normal S1, S2, no murmurs, rubs, clicks or gallops.   Chest: clear to auscultation, no wheezes, rales or rhonchi,   Extremities: no edema ASSESSMENT and PLAN    1. Hyperlipidemia, unspecified hyperlipidemia type  Assessment & Plan:  Well controlled in past on Crestor  Repeat lipid panel   Orders:  -     METABOLIC PANEL, COMPREHENSIVE; Future  -     LIPID PANEL; Future  2. Type 2 diabetes mellitus without complication, with long-term current use of insulin (HCC)  -     METABOLIC PANEL, COMPREHENSIVE; Future  -     HEMOGLOBIN A1C WITH EAG; Future  -     MICROALBUMIN, UR, RAND W/ MICROALB/CREAT RATIO; Future  -     metFORMIN ER (GLUCOPHAGE XR) 500 mg tablet; TAKE 4 TABLETS BY MOUTH DAILY WITH DINNER, Normal, Disp-360 Tablet, R-1  3. Encounter for colorectal cancer screening  -     REFERRAL TO GASTROENTEROLOGY  4. Encounter for screening for malignant neoplasm of breast, unspecified screening modality  -     [facility] 3D NICOLE W MAMMO BI SCREENING INCL CAD; Future  5.  Type 2 diabetes mellitus with diabetic nephropathy, without long-term current use of insulin ([facility] [address]  Assessment & Plan:  Labs ordered including A1c, urine micro albumin, protein/cr ratio, CMP  Had eye exam   Referred for diabetic teaching, insulin/Ozembic teaching  follow up 3 months

## 2023-02-22 NOTE — ASSESSMENT & PLAN NOTE
Labs ordered including A1c, urine micro albumin, protein/cr ratio, CMP  Had eye exam   Referred for diabetic teaching, insulin/Ozembic teaching  follow up 3 months

## 2023-02-22 NOTE — PATIENT INSTRUCTIONS
Labs today  Diabetic teaching for Insulin and Ozembic administration pending lab results    follow up with Sugar Yan MD in 3 months

## 2023-02-23 ENCOUNTER — PATIENT MESSAGE (OUTPATIENT)
Dept: INTERNAL MEDICINE CLINIC | Age: 53
End: 2023-02-23

## 2023-02-23 ENCOUNTER — OFFICE VISIT (OUTPATIENT)
Dept: INTERNAL MEDICINE CLINIC | Age: 53
End: 2023-02-23

## 2023-02-23 VITALS — WEIGHT: 158.2 LBS | BODY MASS INDEX: 29.89 KG/M2

## 2023-02-23 DIAGNOSIS — Z79.4 TYPE 2 DIABETES MELLITUS WITHOUT COMPLICATION, WITH LONG-TERM CURRENT USE OF INSULIN (HCC): Primary | ICD-10-CM

## 2023-02-23 DIAGNOSIS — E11.9 TYPE 2 DIABETES MELLITUS WITHOUT COMPLICATION, WITH LONG-TERM CURRENT USE OF INSULIN (HCC): Primary | ICD-10-CM

## 2023-02-23 DIAGNOSIS — E11.9 CONTROLLED TYPE 2 DIABETES MELLITUS WITHOUT COMPLICATION, WITHOUT LONG-TERM CURRENT USE OF INSULIN (HCC): ICD-10-CM

## 2023-02-23 LAB
ALBUMIN SERPL-MCNC: 4.2 G/DL (ref 3.5–5)
ALBUMIN/GLOB SERPL: 1.4 (ref 1.1–2.2)
ALP SERPL-CCNC: 62 U/L (ref 45–117)
ALT SERPL-CCNC: 36 U/L (ref 12–78)
ANION GAP SERPL CALC-SCNC: 10 MMOL/L (ref 5–15)
AST SERPL-CCNC: 23 U/L (ref 15–37)
BILIRUB SERPL-MCNC: 0.8 MG/DL (ref 0.2–1)
BUN SERPL-MCNC: 21 MG/DL (ref 6–20)
BUN/CREAT SERPL: 29 (ref 12–20)
CALCIUM SERPL-MCNC: 9.1 MG/DL (ref 8.5–10.1)
CHLORIDE SERPL-SCNC: 100 MMOL/L (ref 97–108)
CHOLEST SERPL-MCNC: 89 MG/DL
CO2 SERPL-SCNC: 26 MMOL/L (ref 21–32)
CREAT SERPL-MCNC: 0.72 MG/DL (ref 0.55–1.02)
CREAT UR-MCNC: 45 MG/DL
EST. AVERAGE GLUCOSE BLD GHB EST-MCNC: 292 MG/DL
GLOBULIN SER CALC-MCNC: 2.9 G/DL (ref 2–4)
GLUCOSE SERPL-MCNC: 254 MG/DL (ref 65–100)
HBA1C MFR BLD: 11.8 % (ref 4–5.6)
HDLC SERPL-MCNC: 43 MG/DL
HDLC SERPL: 2.1 (ref 0–5)
LDLC SERPL CALC-MCNC: 23.8 MG/DL (ref 0–100)
MICROALBUMIN UR-MCNC: 6.97 MG/DL
MICROALBUMIN/CREAT UR-RTO: 155 MG/G (ref 0–30)
POTASSIUM SERPL-SCNC: 4.1 MMOL/L (ref 3.5–5.1)
PROT SERPL-MCNC: 7.1 G/DL (ref 6.4–8.2)
SODIUM SERPL-SCNC: 136 MMOL/L (ref 136–145)
TRIGL SERPL-MCNC: 111 MG/DL (ref ?–150)
VLDLC SERPL CALC-MCNC: 22.2 MG/DL

## 2023-02-23 RX ORDER — INSULIN GLARGINE 100 [IU]/ML
10 INJECTION, SOLUTION SUBCUTANEOUS DAILY
Qty: 3 ML | Refills: 3 | Status: SHIPPED | OUTPATIENT
Start: 2023-02-23

## 2023-02-23 NOTE — PROGRESS NOTES
Pharmacy Progress Note - Diabetes Management    Assessment / Plan:   Diabetes Management:  - Per ADA guidelines, Pt's A1c is not at goal of <7%  - A1c and microalbumin/creatinine last checked 02/22/23  - Not checking BG unless experiencing sx of hyperglycemia (feels hot). - Adherent to metformin. Experiencing few side effects, except for rare episodes of diarrhea  - Not currently taking insulin glargine or Ozempic  - Patient reports not feeling comfortable injecting herself and unsure of how to use injection pens  - Provided education on how to inject medications using demonstration pens and tutorial video online    Cardiovascular Management:  - LDL at goal of <70 mg/dL. Start Lantus 10 mg injected once daily under skin  Start Ozempic 0.25 mg injected once weekly under skin  Continue metformin 2000 mg every evening with dinner  Bring BG meter/log to next appointment  Bring insulin and Ozempic to next appointment for further assistance injecting herself      Nutrition/Lifestyle Modifications:  - Recommend ~30 minutes consistent, moderately intensive, exercise/day or ~150 minutes/week. Start small, stay consistent, and increase length and types of exercise, as tolerated. S/O: Ms. Abimbola Hinojosa is a 48 y.o. female, referred by Dr. Beulah Bryant MD, with a PMH of T2DM, HLD, allergic rhinitis, was seen today for diabetes management follow up. Patient's last A1c was 11.8% (02/22/23), decreased from 12.2% (3/2022) and 12.6% (9/2021), but increased from 9.4% (2/2021). Interim update:   - Patient is followed by Dr. Tahmina Moeller with Endocrinology. She reports not taking Ozempic or Lantus injections as she is afraid of injecting herself. Provided education on how to use medications. Patient does not have a current supply of insulin or Ozempic. She reports requesting refills of both while in the office with this writer.     Current anti-hyperglycemic regimen include(s):    - metformin 500 mg ER 4 tablets every night with dinner (2000 mg/day)  - Ozempic 0.5 mg injected under skin every 7 days  - insulin glargine 10 units injected under skin every day    Cardiovascular medications:  - ACEi/ARB: none  - ASA: none  - Statin: rosuvastatin 40 mg qHS    - Last microalbumin/creatinine ratio - 155 mg/g (2/2023)     ROS:  Today, Pt endorses:  - Symptoms of Hyperglycemia: none  - Symptoms of Hypoglycemia: none  - feels hot when her sugar is high >200 mg/dL    Blood Glucose Monitoring (BGM) or CGM:  - Brought in home glucometer/blood glucose log/CGM reader today:  no  - Conducts: not regularly checking BG    - Physical Activity:   - not regularly exercising    Last eye exam:  02/2023    The ASCVD Risk score (Sammie DENG, et al., 2019) failed to calculate for the following reasons: The valid total cholesterol range is 130 to 320 mg/dL     Vitals: Wt Readings from Last 3 Encounters:   02/22/23 163 lb (73.9 kg)   05/31/22 158 lb (71.7 kg)   05/28/22 160 lb (72.6 kg)     BP Readings from Last 3 Encounters:   02/22/23 133/86   05/31/22 128/75   05/28/22 134/75     Pulse Readings from Last 3 Encounters:   02/22/23 76   06/03/22 81   05/31/22 81       Past Medical History:   Diagnosis Date    Diabetes mellitus     Hyperlipidemia 12/9/2015    Type 2 diabetes mellitus with diabetic nephropathy, without long-term current use of insulin ([facility]) 2/22/2023     No Known Allergies    Current Outpatient Medications   Medication Sig    soy isofla/blk cohosh/mag bark (ESTROVEN PO) Take 1 Tablet by mouth daily. metFORMIN ER (GLUCOPHAGE XR) 500 mg tablet TAKE 4 TABLETS BY MOUTH DAILY WITH DINNER    rosuvastatin (CRESTOR) 40 mg tablet Take 1 Tablet by mouth nightly. glucose blood VI test strips (OneTouch Ultra Test) strip TEST BLOOD SUGAR DAILY    semaglutide (OZEMPIC) 0.25 mg or 0.5 mg/dose (2 mg/1.5 ml) subq pen 0.5 mg by SubCUTAneous route every seven (7) days.     insulin glargine (LANTUS,BASAGLAR) 100 unit/mL (3 mL) inpn 10 units every day Blood-Glucose Meter monitoring kit Pharmacist to select based on patients insurance. DX E11.9    lancets (Lancets,Ultra Thin) misc CHECK BLOOD SUGAR AS DIRECTED    acetaminophen (TYLENOL) 325 mg tablet Take  by mouth every four (4) hours as needed for Pain.    glucose blood VI test strips (BLOOD GLUCOSE TEST) strip Pharmacist to choose preferred meter and strips. Dx:    fluticasone (FLONASE) 50 mcg/actuation nasal spray 2 Sprays by Both Nostrils route daily. coenzyme q10-vitamin e (COQ10 ) 100-100 mg-unit cap Take 2 Caps by mouth daily. multivitamin (ONE A DAY) tablet Take 1 Tab by mouth daily. naproxen sodium 220 mg cap Take 1 Cap by mouth daily as needed. Cetirizine 10 mg cap Take 1 Capsule by mouth daily as needed. No current facility-administered medications for this visit. Lab Results   Component Value Date/Time    Sodium 132 (L) 03/24/2022 12:56 PM    Potassium 3.9 03/24/2022 12:56 PM    Chloride 99 03/24/2022 12:56 PM    CO2 24 03/24/2022 12:56 PM    Anion gap 9 03/24/2022 12:56 PM    Glucose 261 (H) 03/24/2022 12:56 PM    BUN 16 03/24/2022 12:56 PM    Creatinine 0.68 03/24/2022 12:56 PM    BUN/Creatinine ratio 24 (H) 03/24/2022 12:56 PM    GFR est AA >60 03/24/2022 12:56 PM    GFR est non-AA >60 03/24/2022 12:56 PM    Calcium 8.7 03/24/2022 12:56 PM    Bilirubin, total 0.7 03/24/2022 12:56 PM    Alk.  phosphatase 78 03/24/2022 12:56 PM    Protein, total 7.0 03/24/2022 12:56 PM    Albumin 4.3 03/24/2022 12:56 PM    Globulin 2.7 03/24/2022 12:56 PM    A-G Ratio 1.6 03/24/2022 12:56 PM    ALT (SGPT) 42 03/24/2022 12:56 PM       Lab Results   Component Value Date/Time    Cholesterol, total 94 03/24/2022 12:56 PM    HDL Cholesterol 45 03/24/2022 12:56 PM    LDL, calculated 24.2 03/24/2022 12:56 PM    VLDL, calculated 24.8 03/24/2022 12:56 PM    Triglyceride 124 03/24/2022 12:56 PM    CHOL/HDL Ratio 2.1 03/24/2022 12:56 PM       Lab Results   Component Value Date/Time    WBC 6.6 03/24/2022 12:56 PM    HGB 13.5 03/24/2022 12:56 PM    HCT 41.3 03/24/2022 12:56 PM    PLATELET 109 57/34/4214 12:56 PM    MCV 83.6 03/24/2022 12:56 PM       Lab Results   Component Value Date/Time    Microalbumin/Creat ratio (mg/g creat) 126 (H) 03/24/2022 12:56 PM    Microalbumin,urine random 6.93 03/24/2022 12:56 PM       HbA1c:  Lab Results   Component Value Date/Time    Hemoglobin A1c 12.2 (H) 03/24/2022 12:56 PM    Hemoglobin A1c (POC) 6.6 12/03/2015 12:21 PM     No components found for: 2     Last Point of Care HGB A1C  Hemoglobin A1c (POC)   Date Value Ref Range Status   12/03/2015 6.6 % Final        CrCl cannot be calculated (Patient's most recent lab result is older than the maximum 180 days allowed. ). Medication reconciliation was completed during the visit. There are no discontinued medications. Patient verbalized understanding of the information presented and all of the patients questions were answered. AVS was handed to the patient. Patient advised to call the office with any additional questions or concerns. Notifications of recommendations will be sent to Dr. Jong Motley MD for review. Patient will return to clinic in 1 week(s) with medication for insulin and Ozempic teaching.      Thank you for the consult,  Jonna Dillon PharmD

## 2023-02-23 NOTE — PATIENT INSTRUCTIONS
- Check blood sugar every morning before breakfast and bring log to next appointment (goal  mg/dL)  - Check blood sugar 2 hours after eating dinner once a week (goal less than 180 mg/dL)  -  Insulin and Ozempic from the pharmacy today and start taking them  - Call [facility] at [phone] if you have any trouble getting medications today  - Consider low carb/low sugar protein shakes

## 2023-02-23 NOTE — TELEPHONE ENCOUNTER
Pharmacy Progress Note     Pt contacted this writer via [employer] messagestating her Lantus was no longer covered by insurance. Attempted to contact insurance for formulary/coverage info. Was on hold for approx 1 hour. Decided to switch to [address] [place] [address] with 1:1 dose conversion. Medications Discontinued During This Encounter   Medication Reason    insulin glargine (LANTUS,BASAGLAR) 100 unit/mL (3 mL) inpn Cost of Medication     Orders Placed This Encounter    insulin glargine (Basaglar KwikPen U-100 Insulin) 100 unit/mL (3 mL) inpn     Sig: 10 Units by SubCUTAneous route daily.      Dispense:  3 mL     Refill:  3         Aissatou Zentgraf, PharmD, BCGP, BCACP  Clinical Pharmacist Specialist      For Pharmacy Admin Tracking Only    Program: Medication Management  CPA in place: Yes  Recommendation Provided To: Patient/Caregiver: 3 via Telephone and [employer] and Other: 3  Intervention Detail: Benefit Assistance, Discontinued Rx: 1, reason: Cost/Formulary Change, and New Rx: 1, reason: Cost/Formulary Change  Intervention Accepted By: Patient/Caregiver: 3 and Other: 3  Time Spent (min): 60

## 2023-02-23 NOTE — PROGRESS NOTES
Patient was seen with Sharmaine Price PharmD, PGY1 resident. I was present for the visit and agree with the assessment and plan. Please see their note for more details of the visit.     Zane Granda PharmD, BCGP, [facility]  Clinical Pharmacist Specialist      For Pharmacy Admin Tracking Only    Program: Medical Group  CPA in place: Yes  Recommendation Provided To: Patient/Caregiver: 2 via In person  Intervention Detail: Device Training  Intervention Accepted By: Patient/Caregiver: 2  Time Spent (min): 60

## 2023-02-24 ENCOUNTER — TELEPHONE (OUTPATIENT)
Dept: ENDOCRINOLOGY | Age: 53
End: 2023-02-24

## 2023-02-24 NOTE — TELEPHONE ENCOUNTER
----- Message from [facility] TOSHIAFarhat Hernandez sent at 2/23/2023  3:37 PM EST -----  Regarding: Insulin Medication - Lantus  Hi,    The other drug similar to Lantus that they cover is BASAGLAR and INSULIN GLARGINE - YFGN (interchangeable biosimilar)  I attached the list of insulins covered.      Thank you  Eliseo Simons

## 2023-02-27 NOTE — PROGRESS NOTES
Pharmacy Progress Note - Diabetes Management    Assessment / Plan:   Diabetes Management:  - Per ADA guidelines, Pt's A1c is not at goal of <7%. - Current SMBG(s) much closer to goal fasting and post prandial BGs  - Discussed dinner the previous night can affect fasting BG the next morning  - She endorses making lifestyle changes to reduce carbohydrate intake and increase physical activity  - Reviewed and provided resource discussing s/sx of hypoglycemia and management  - She misunderstood prescription instructions and started on 0.5 mg Ozepmic once weekly. Tolerated well and educated her that the only reason for starting at 0.25 mg once weekly was to avoid nausea and vomiting. No GI symptoms at 0.5 mg dose. Will continue current dose. - Tolerating insulin well. Reports injecting nightly. Discussed not doubling up if she forgets nighttime dose. - Continues to tolerate maximum dose of metformin  - Pharmacy-led diabetes services not being continued at this office after 3/30/23. Encouraged her to make an appt to see her PCP for future management. - Continue metformin 2000 mg with dinner  - Continue insulin Basaglar 10 units injected under skin once daily  - Continue Ozempic 0.5 mg injected under skin once weekly    Nutrition/Lifestyle Modifications:  - Recommend ~30 minutes consistent, moderately intensive, exercise/day or ~150 minutes/week. Start small, stay consistent, and increase length and types of exercise, as tolerated. - Educated pt about reading nutrition labels w/ a focus on carbohydrate/protein/sugar/fiber/fat content. - Reviewed low-carb alternatives to existing food choices     S/O: Ms. Fayrene Bamberger is a 48 y.o. female, referred by Dr. German Myers MD, with a PMH of T2DM, HLD, allergic rhinitis, was seen today for diabetes management follow up. Patient's last A1c was 11.8% (02/22/23), decreased from 12.2% (3/2022) and 12.6% (9/2021), but increased from 9.4% (2/2021).      Interim update: - 1 week follow-up  - Provided education on using insulin last week. - Patient did not have her insulin and Ozempic prescriptions with her last week. She agreed to start insulin on the day she picked up her prescriptions  - She reports starting insulin 2/23 and Ozempic 2/24  - She took 0.5 mg Ozempic injected under skin on Friday since the prescription said 0.5 mg. Reviewed the titration of 0.25 mg once weekly for four weeks, then increasing to 0.5 mg once weekly for four weeks. - Reports walking 20-30 minutes daily  - Trying to avoid carbohydrates    Current anti-hyperglycemic regimen include(s):    -  insulin glargine (Basaglar) 10 mg injected once daily under skin  - Ozempic 0.5 mg injected on Friday night once weekly under skin  - metformin 2000 mg every evening with dinner    Cardiovascular medications:  - ACEi/ARB: none  - ASA: none  - Statin: rosuvastatin 40 mg qHS    - Last micro/albumin - 150 mg/g (2/2023)     ROS:  Today, Pt endorses:  - Symptoms of Hyperglycemia: none  - Symptoms of Hypoglycemia: none    Blood Glucose Monitoring (BGM) or CGM:  - Brought in home glucometer/blood glucose log today:  yes  - Conducts: FBG and throughout the day  - Fasting SMBG today: 123 mg/dL  - Post-prandial:  - SMBG range: 123-220s (started insulin 2/23/23)    Date AM PM   3/1/2023 123    2/28/2023 173 123   2/27/2023 188 124/144/116/174   2/26/2023 117 162/124/132   2/25/2023 148 121/125   2/24/2023 217 141/176/197   2/23/2023 255 187/237/213   2/22/2023  185/331/371/227   2/21/2023 272 223/193/248   2/20/2023  214/283/323       Nutrition/Lifestyle Modifications:  - Eats 3 meals/day ;  - Breakfast: Two Good yogurt (vanilla) with susie seeds  - Lunch: tuna fish  - Dinner: meat with vegetable (tomatoes, spinach), avoiding potatoes  - Snack(s): peanuts  - Beverage(s): water, coffee with benefiber and 1 pump of hazelnut syrup (not sugar-free), unsweetened ice tea  - Alcohol consumption?  No    - Physical Activity:   - walking 20-30 minutes a     Last eye exam:  02/2023    The ASCVD Risk score (Sammie DK, et al., 2019) failed to calculate for the following reasons: The valid total cholesterol range is 130 to 320 mg/dL     Vitals: Wt Readings from Last 3 Encounters:   03/01/23 159 lb 3.2 oz (72.2 kg)   02/23/23 158 lb 3.2 oz (71.8 kg)   02/22/23 163 lb (73.9 kg)     BP Readings from Last 3 Encounters:   02/22/23 133/86   05/31/22 128/75   05/28/22 134/75     Pulse Readings from Last 3 Encounters:   02/22/23 76   06/03/22 81   05/31/22 81       Past Medical History:   Diagnosis Date    Diabetes mellitus     Hyperlipidemia 12/9/2015    Type 2 diabetes mellitus with diabetic nephropathy, without long-term current use of insulin ([facility]) 2/22/2023     No Known Allergies    Current Outpatient Medications   Medication Sig    insulin glargine (Basaglar KwikPen U-100 Insulin) 100 unit/mL (3 mL) inpn 10 Units by SubCUTAneous route daily. soy isofla/blk cohosh/mag bark (ESTROVEN PO) Take 1 Tablet by mouth daily. metFORMIN ER (GLUCOPHAGE XR) 500 mg tablet TAKE 4 TABLETS BY MOUTH DAILY WITH DINNER    rosuvastatin (CRESTOR) 40 mg tablet Take 1 Tablet by mouth nightly. glucose blood VI test strips (OneTouch Ultra Test) strip TEST BLOOD SUGAR DAILY    semaglutide (OZEMPIC) 0.25 mg or 0.5 mg/dose (2 mg/1.5 ml) subq pen 0.5 mg by SubCUTAneous route every seven (7) days. Blood-Glucose Meter monitoring kit Pharmacist to select based on patients insurance. DX E11.9    lancets (Lancets,Ultra Thin) misc CHECK BLOOD SUGAR AS DIRECTED    acetaminophen (TYLENOL) 325 mg tablet Take  by mouth every four (4) hours as needed for Pain.    glucose blood VI test strips (BLOOD GLUCOSE TEST) strip Pharmacist to choose preferred meter and strips. Dx:    fluticasone (FLONASE) 50 mcg/actuation nasal spray 2 Sprays by Both Nostrils route daily. coenzyme q10-vitamin e (COQ10 ) 100-100 mg-unit cap Take 2 Caps by mouth daily.     multivitamin (ONE A DAY) tablet Take 1 Tab by mouth daily. naproxen sodium 220 mg cap Take 1 Cap by mouth daily as needed. Cetirizine 10 mg cap Take 1 Capsule by mouth daily as needed. No current facility-administered medications for this visit. Lab Results   Component Value Date/Time    Sodium 136 02/22/2023 12:31 PM    Potassium 4.1 02/22/2023 12:31 PM    Chloride 100 02/22/2023 12:31 PM    CO2 26 02/22/2023 12:31 PM    Anion gap 10 02/22/2023 12:31 PM    Glucose 254 (H) 02/22/2023 12:31 PM    BUN 21 (H) 02/22/2023 12:31 PM    Creatinine 0.72 02/22/2023 12:31 PM    BUN/Creatinine ratio 29 (H) 02/22/2023 12:31 PM    GFR est AA >60 03/24/2022 12:56 PM    GFR est non-AA >60 03/24/2022 12:56 PM    Calcium 9.1 02/22/2023 12:31 PM    Bilirubin, total 0.8 02/22/2023 12:31 PM    Alk.  phosphatase 62 02/22/2023 12:31 PM    Protein, total 7.1 02/22/2023 12:31 PM    Albumin 4.2 02/22/2023 12:31 PM    Globulin 2.9 02/22/2023 12:31 PM    A-G Ratio 1.4 02/22/2023 12:31 PM    ALT (SGPT) 36 02/22/2023 12:31 PM       Lab Results   Component Value Date/Time    Cholesterol, total 89 02/22/2023 12:31 PM    HDL Cholesterol 43 02/22/2023 12:31 PM    LDL, calculated 23.8 02/22/2023 12:31 PM    VLDL, calculated 22.2 02/22/2023 12:31 PM    Triglyceride 111 02/22/2023 12:31 PM    CHOL/HDL Ratio 2.1 02/22/2023 12:31 PM       Lab Results   Component Value Date/Time    WBC 6.6 03/24/2022 12:56 PM    HGB 13.5 03/24/2022 12:56 PM    HCT 41.3 03/24/2022 12:56 PM    PLATELET 536 74/64/4961 12:56 PM    MCV 83.6 03/24/2022 12:56 PM       Lab Results   Component Value Date/Time    Microalbumin/Creat ratio (mg/g creat) 155 (H) 02/22/2023 12:31 PM    Microalbumin,urine random 6.97 02/22/2023 12:31 PM       HbA1c:  Lab Results   Component Value Date/Time    Hemoglobin A1c 11.8 (H) 02/22/2023 12:31 PM    Hemoglobin A1c (POC) 6.6 12/03/2015 12:21 PM     No components found for: 2     Last Point of Care HGB A1C  Hemoglobin A1c (POC)   Date Value Ref Range Status   12/03/2015 6.6 % Final        Estimated Creatinine Clearance: 84.1 mL/min (by C-G formula based on SCr of 0.72 mg/dL). Medication reconciliation was completed during the visit. There are no discontinued medications. Patient verbalized understanding of the information presented and all of the patients questions were answered. AVS was handed to the patient. Patient advised to call the office with any additional questions or concerns. Notifications of recommendations will be sent to Dr. Paola Hudson MD for review.     Thank you for the consult,  Ramana Camilo, PharmD

## 2023-03-01 ENCOUNTER — OFFICE VISIT (OUTPATIENT)
Dept: INTERNAL MEDICINE CLINIC | Age: 53
End: 2023-03-01

## 2023-03-01 VITALS — BODY MASS INDEX: 30.08 KG/M2 | WEIGHT: 159.2 LBS

## 2023-03-01 DIAGNOSIS — E11.9 CONTROLLED TYPE 2 DIABETES MELLITUS WITHOUT COMPLICATION, WITHOUT LONG-TERM CURRENT USE OF INSULIN (HCC): Primary | ICD-10-CM

## 2023-03-01 NOTE — PATIENT INSTRUCTIONS
Great job checking your blood sugar!!! Super job starting insulin and Ozempic!!   Continue with insulin 10 units daily, metformin 2000 mg daily, and Ozempic 0.5 mg once weekly  Make an appointment with Dr. Tosha Bergeron

## 2023-03-01 NOTE — PROGRESS NOTES
Patient was seen with Ketan Louis PharmD, PGY1 resident. I was present for the visit and agree with the assessment and plan. Please see their note for more details of the visit.     Merrianne Cheadle, Jack, BCGP, BCACP  Clinical Pharmacist Specialist      For Pharmacy Admin Tracking Only    Program: Medical Group  CPA in place: Yes  Recommendation Provided To: Patient/Caregiver: 1 via In person  Intervention Detail: Scheduled Appointment  Intervention Accepted By: Patient/Caregiver: 1  Time Spent (min): 60

## 2023-03-06 DIAGNOSIS — E11.9 CONTROLLED TYPE 2 DIABETES MELLITUS WITHOUT COMPLICATION, WITHOUT LONG-TERM CURRENT USE OF INSULIN (HCC): ICD-10-CM

## 2023-03-08 RX ORDER — BLOOD SUGAR DIAGNOSTIC
STRIP MISCELLANEOUS
Qty: 400 STRIP | Refills: 3 | Status: SHIPPED | OUTPATIENT
Start: 2023-03-08

## 2023-03-28 ENCOUNTER — TELEPHONE (OUTPATIENT)
Dept: INTERNAL MEDICINE CLINIC | Age: 53
End: 2023-03-28

## 2023-03-28 NOTE — TELEPHONE ENCOUNTER
----- [facility] sent at 3/28/2023  9:29 AM EDT -----  Regarding: rBi Osuna,    I am currently using Ozempic once a week at .5mg per shot. My current prescription is for 1.5mg only so I only use it for 3 weeks and when I try to refill, it says it's too early for refill. Is it possible to change the prescription for a full 1 month dosage so I do not run out by 4th week of each month?     Thank you  Jimy Henao

## 2023-04-02 DIAGNOSIS — Z79.4 TYPE 2 DIABETES MELLITUS WITHOUT COMPLICATION, WITH LONG-TERM CURRENT USE OF INSULIN (HCC): ICD-10-CM

## 2023-04-02 DIAGNOSIS — E11.9 TYPE 2 DIABETES MELLITUS WITHOUT COMPLICATION, WITH LONG-TERM CURRENT USE OF INSULIN (HCC): ICD-10-CM

## 2023-04-03 RX ORDER — METFORMIN HYDROCHLORIDE 500 MG/1
TABLET, EXTENDED RELEASE ORAL
Qty: 360 TABLET | Refills: 1 | OUTPATIENT
Start: 2023-04-03

## 2023-04-03 NOTE — PROGRESS NOTES
HISTORY OF PRESENT ILLNESS  Bobby Machuca is a 48 y.o. female. HPI  Ms. Hernandez returns for follow up of her type 2 diabetes today. When she was seen last month her hemoglobin A1C was 11.8%. She had been prescribed Ozempic and Lantus in 3/22 by Dr Mari Garland, Endocrine, but never took it as she was not comfortable with the injections. The patient met with Shirly Duverney, PharmD and was educated on Insulin and Ozempic, diet and exercise. She returns now doing very well. She is taking her medication, cut back on her carbs and is walking 30 min a day. Her weight is down 8 lbs. She denies side effects from Ozempic. BS's running < 140, and she has had occasional low readings 65, 70. These low sugars came in the AM, fasting and when she exercised and did not eat breakfast. Reviewed the importance of regular meals and suggested she bring a snack with her on walks in case of lowe blood sugars. She will continue to monitor and will call if additional low sugars. Patient Active Problem List   Diagnosis Code    Allergic rhinitis, seasonal J30.2    Diabetes mellitus type 2, controlled ([address] E11.9    Hyperlipidemia E78.5    Type 2 diabetes mellitus with diabetic nephropathy, without long-term current use of insulin ([facility] E11.21     Current Outpatient Medications on File Prior to Visit   Medication Sig Dispense Refill    glucose blood VI test strips (OneTouch Ultra Test) strip TEST BLOOD SUGAR FOUR (4) TIMES DAILY 400 Strip 3    insulin glargine (Basaglar KwikPen U-100 Insulin) 100 unit/mL (3 mL) inpn 10 Units by SubCUTAneous route daily. 3 mL 3    soy isofla/blk cohosh/mag bark (ESTROVEN PO) Take 1 Tablet by mouth daily. metFORMIN ER (GLUCOPHAGE XR) 500 mg tablet TAKE 4 TABLETS BY MOUTH DAILY WITH DINNER 360 Tablet 1    rosuvastatin (CRESTOR) 40 mg tablet Take 1 Tablet by mouth nightly.  90 Tablet 1    semaglutide (OZEMPIC) 0.25 mg or 0.5 mg/dose (2 mg/1.5 ml) subq pen 0.5 mg by SubCUTAneous route every seven (7) days. 1 Box 3    Blood-Glucose Meter monitoring kit Pharmacist to select based on patients insurance. DX E11.9 1 Kit 0    lancets (Lancets,Ultra Thin) misc CHECK BLOOD SUGAR AS DIRECTED 100 Each 3    acetaminophen (TYLENOL) 325 mg tablet Take  by mouth every four (4) hours as needed for Pain.      glucose blood VI test strips (BLOOD GLUCOSE TEST) strip Pharmacist to choose preferred meter and strips. Dx: 100 Strip 3    fluticasone (FLONASE) 50 mcg/actuation nasal spray 2 Sprays by Both Nostrils route daily. 1 Bottle 0    coenzyme q10-vitamin e (COQ10 ) 100-100 mg-unit cap Take 2 Caps by mouth daily. multivitamin (ONE A DAY) tablet Take 1 Tab by mouth daily. naproxen sodium 220 mg cap Take 1 Cap by mouth daily as needed. Cetirizine 10 mg cap Take 1 Capsule by mouth daily as needed. No current facility-administered medications on file prior to visit. Physical Exam  Visit Vitals  /81   Pulse 79   Temp 97.3 °F (36.3 °C) (Temporal)   Resp 16   Ht 5' 1\" (1.549 m)   Wt 151 lb 12.8 oz (68.9 kg)   SpO2 97%   BMI 28.68 kg/m²   Patient appears well  Heart[de-identified] normal rate, regular rhythm, normal S1, S2, without murmur  Chest: clear to auscultation bilaterally  Extremities: no edema     ASSESSMENT and PLAN  Type 2 Diabetes: significantly improved with medication use, diet, and exercise. She will continue to monitor her blood sugars, reviewed need to eat meals regularly and advised to call if additional low  readings. Will not increase Ozempic further at this time. Advised patient to follow up in about 2 months. Repeat Hgb A1C 1 week prior to follow up   Hyperlipidemia: controlled on high dose Crestor. Continue current medication.

## 2023-04-04 ENCOUNTER — OFFICE VISIT (OUTPATIENT)
Dept: INTERNAL MEDICINE CLINIC | Age: 53
End: 2023-04-04
Payer: COMMERCIAL

## 2023-04-04 DIAGNOSIS — E11.21 TYPE 2 DIABETES MELLITUS WITH DIABETIC NEPHROPATHY, WITHOUT LONG-TERM CURRENT USE OF INSULIN (HCC): Primary | ICD-10-CM

## 2023-04-04 PROCEDURE — 99214 OFFICE O/P EST MOD 30 MIN: CPT | Performed by: INTERNAL MEDICINE

## 2023-04-05 NOTE — PATIENT INSTRUCTIONS
Please continue your current medications. Continue to check your blood sugars 2-3x/day and call if you notice sugars running low < 90 or  high > 250. follow up in about 2 months  Call or return to clinic if these symptoms worsen or fail to improve as anticipated.

## 2023-04-23 DIAGNOSIS — E11.21 TYPE 2 DIABETES MELLITUS WITH DIABETIC NEPHROPATHY, WITHOUT LONG-TERM CURRENT USE OF INSULIN (HCC): Primary | ICD-10-CM

## 2023-04-30 DIAGNOSIS — E11.9 TYPE 2 DIABETES MELLITUS WITHOUT COMPLICATION, WITH LONG-TERM CURRENT USE OF INSULIN (HCC): ICD-10-CM

## 2023-04-30 DIAGNOSIS — Z79.4 TYPE 2 DIABETES MELLITUS WITHOUT COMPLICATION, WITH LONG-TERM CURRENT USE OF INSULIN (HCC): ICD-10-CM

## 2023-05-02 RX ORDER — METFORMIN HYDROCHLORIDE 500 MG/1
TABLET, EXTENDED RELEASE ORAL
Qty: 360 TABLET | Refills: 1 | Status: SHIPPED | OUTPATIENT
Start: 2023-05-02

## 2023-05-03 DIAGNOSIS — E11.9 TYPE 2 DIABETES MELLITUS WITHOUT COMPLICATION, WITH LONG-TERM CURRENT USE OF INSULIN (HCC): ICD-10-CM

## 2023-05-03 DIAGNOSIS — Z79.4 TYPE 2 DIABETES MELLITUS WITHOUT COMPLICATION, WITH LONG-TERM CURRENT USE OF INSULIN (HCC): ICD-10-CM

## 2023-05-03 RX ORDER — INSULIN GLARGINE 100 [IU]/ML
INJECTION, SOLUTION SUBCUTANEOUS
Qty: 3 ML | Refills: 0 | Status: SHIPPED | OUTPATIENT
Start: 2023-05-03

## 2023-05-31 NOTE — TELEPHONE ENCOUNTER
----- Message from Diana Marie sent at 8/4/2022  3:26 PM EDT -----  Subject: Referral Request    Reason for referral request? blood work before her appointment  Provider patient wants to be referred to(if known):     Provider Phone Number(if known): Additional Information for Provider?  6 month check up Aug 26th  ---------------------------------------------------------------------------  --------------  [address] [facility]    [phone]; OK to leave message on voicemail  ---------------------------------------------------------------------------  --------------
Labs ordered notify patient
Ok to order CPM, A1c? Any other orders?
Spoke with patient and notified labs ordered, she plans to go to [facility] prior to her appointment on 8/26/22. FYI.
No

## 2023-06-05 ENCOUNTER — TELEPHONE (OUTPATIENT)
Age: 53
End: 2023-06-05

## 2023-06-05 NOTE — TELEPHONE ENCOUNTER
Medication Refill Request    Omkar Whaley is requesting a refill of the following medication(s):      insulin glargine [facility]) 100 UNIT/ML injection pen        Please send refill to:     Kate Santillan [address] [place] [address] [phone] [facility] [address]  [address] [address]  Phone: [phone] Fax: [phone]

## 2023-06-06 RX ORDER — INSULIN GLARGINE 100 [IU]/ML
10 INJECTION, SOLUTION SUBCUTANEOUS DAILY
Qty: 5 ADJUSTABLE DOSE PRE-FILLED PEN SYRINGE | Refills: 1 | Status: SHIPPED | OUTPATIENT
Start: 2023-06-06

## 2023-06-06 NOTE — TELEPHONE ENCOUNTER
Chief Complaint   Patient presents with    Medication Refill     Last Appointment with Adrian Ortiz 4/4/23    Future Appointments   Date Time Provider Pilo Ceballos   6/13/2023  4:00 PM MD ASHIA Keith BS DONATO Rothman

## 2023-07-03 ENCOUNTER — TELEPHONE (OUTPATIENT)
Age: 53
End: 2023-07-03

## 2023-07-03 RX ORDER — PEN NEEDLE, DIABETIC 32GX 5/32"
NEEDLE, DISPOSABLE MISCELLANEOUS
COMMUNITY
Start: 2023-05-02 | End: 2023-07-03 | Stop reason: SDUPTHER

## 2023-07-03 RX ORDER — PEN NEEDLE, DIABETIC 32GX 5/32"
NEEDLE, DISPOSABLE MISCELLANEOUS
Qty: 100 EACH | Refills: 0 | Status: SHIPPED | OUTPATIENT
Start: 2023-07-03

## 2023-07-05 DIAGNOSIS — E11.21 TYPE 2 DIABETES MELLITUS WITH DIABETIC NEPHROPATHY, WITHOUT LONG-TERM CURRENT USE OF INSULIN (HCC): Primary | ICD-10-CM

## 2023-07-05 RX ORDER — SEMAGLUTIDE 0.68 MG/ML
INJECTION, SOLUTION SUBCUTANEOUS
Qty: 3 ML | OUTPATIENT
Start: 2023-07-05

## 2023-07-05 NOTE — TELEPHONE ENCOUNTER
Chief Complaint   Patient presents with    Medication Refill     Last Appointment with Dr. Dorine Babinski   Date Time Provider [address]   10/3/2023  9:00 AM Jesse Nobles MD [facility] MERON FAYE AMB

## 2023-07-06 ENCOUNTER — CLINICAL DOCUMENTATION (OUTPATIENT)
Age: 53
End: 2023-07-06

## 2023-08-18 DIAGNOSIS — E11.21 TYPE 2 DIABETES MELLITUS WITH DIABETIC NEPHROPATHY, WITHOUT LONG-TERM CURRENT USE OF INSULIN (HCC): ICD-10-CM

## 2023-08-18 RX ORDER — SEMAGLUTIDE 0.68 MG/ML
INJECTION, SOLUTION SUBCUTANEOUS
Qty: 3 ML | Refills: 0 | Status: SHIPPED | OUTPATIENT
Start: 2023-08-18 | End: 2023-08-23 | Stop reason: SDUPTHER

## 2023-08-18 NOTE — TELEPHONE ENCOUNTER
See if we can move her appt into august or sept for 30 min to follow up on her diabetes and she needs lab work prior to her visit.

## 2023-08-23 ENCOUNTER — OFFICE VISIT (OUTPATIENT)
Age: 53
End: 2023-08-23
Payer: COMMERCIAL

## 2023-08-23 VITALS
BODY MASS INDEX: 29.6 KG/M2 | DIASTOLIC BLOOD PRESSURE: 75 MMHG | TEMPERATURE: 97.1 F | HEART RATE: 81 BPM | WEIGHT: 156.8 LBS | RESPIRATION RATE: 16 BRPM | OXYGEN SATURATION: 97 % | HEIGHT: 61 IN | SYSTOLIC BLOOD PRESSURE: 114 MMHG

## 2023-08-23 DIAGNOSIS — Z13.0 SCREENING FOR DEFICIENCY ANEMIA: ICD-10-CM

## 2023-08-23 DIAGNOSIS — E11.21 TYPE 2 DIABETES MELLITUS WITH DIABETIC NEPHROPATHY, WITHOUT LONG-TERM CURRENT USE OF INSULIN (HCC): Primary | ICD-10-CM

## 2023-08-23 DIAGNOSIS — E78.00 PURE HYPERCHOLESTEROLEMIA: ICD-10-CM

## 2023-08-23 PROCEDURE — 99214 OFFICE O/P EST MOD 30 MIN: CPT | Performed by: INTERNAL MEDICINE

## 2023-08-23 PROCEDURE — 3046F HEMOGLOBIN A1C LEVEL >9.0%: CPT | Performed by: INTERNAL MEDICINE

## 2023-08-23 RX ORDER — SEMAGLUTIDE 0.68 MG/ML
INJECTION, SOLUTION SUBCUTANEOUS
Qty: 3 ML | Refills: 0 | Status: SHIPPED | OUTPATIENT
Start: 2023-08-23

## 2023-08-23 RX ORDER — BLOOD SUGAR DIAGNOSTIC
STRIP MISCELLANEOUS
COMMUNITY
Start: 2023-06-09

## 2023-08-23 SDOH — ECONOMIC STABILITY: HOUSING INSECURITY
IN THE LAST 12 MONTHS, WAS THERE A TIME WHEN YOU DID NOT HAVE A STEADY PLACE TO SLEEP OR SLEPT IN A SHELTER (INCLUDING NOW)?: NO

## 2023-08-23 SDOH — ECONOMIC STABILITY: FOOD INSECURITY: WITHIN THE PAST 12 MONTHS, THE FOOD YOU BOUGHT JUST DIDN'T LAST AND YOU DIDN'T HAVE MONEY TO GET MORE.: NEVER TRUE

## 2023-08-23 SDOH — ECONOMIC STABILITY: INCOME INSECURITY: HOW HARD IS IT FOR YOU TO PAY FOR THE VERY BASICS LIKE FOOD, HOUSING, MEDICAL CARE, AND HEATING?: NOT HARD AT ALL

## 2023-08-23 SDOH — ECONOMIC STABILITY: FOOD INSECURITY: WITHIN THE PAST 12 MONTHS, YOU WORRIED THAT YOUR FOOD WOULD RUN OUT BEFORE YOU GOT MONEY TO BUY MORE.: NEVER TRUE

## 2023-08-23 ASSESSMENT — ENCOUNTER SYMPTOMS
ABDOMINAL PAIN: 0
SHORTNESS OF BREATH: 0

## 2023-08-23 NOTE — PROGRESS NOTES
Lou Maguire is a 48 y.o. female who was seen in clinic today (8/23/2023). Assessment & Plan:   Below is the assessment and plan developed based on review of pertinent history, physical exam, labs, studies, and medications. 1. Type 2 diabetes mellitus with diabetic nephropathy, without long-term current use of insulin ([address])  Assessment & Plan:    having some low BS episodes and will stop basaglar. check lab work and plan if lab work is good to increase ozempic to 1 mg weekly to continue with diabetic control and weight loss. Tolerating medication well. Orders:  -     Semaglutide,0.25 or 0.5MG/DOS, (OZEMPIC, 0.25 OR 0.5 MG/DOSE,) 2 MG/3ML SOPN; INJECT 0.5MG UNDER THE SKIN EVERY 7 DAYS, Disp-3 mL, R-0Normal  2. Pure hypercholesterolemia  Assessment & Plan:   Unclear control, continue current medications pending work up below  Orders:  -     Lipid Panel; Future  -     Comprehensive Metabolic Panel; Future  3. Screening for deficiency anemia  -     CBC with Auto Differential; Future     Return in about 3 months (around 11/23/2023) for FOLLOWUP. Subjective/Objective: Roseline Nieto was seen today for  follow up active medical problems and medication management. Patient Active Problem List   Diagnosis    Hyperlipidemia    Allergic rhinitis, seasonal    Diabetes mellitus type 2, controlled ([address])    Type 2 diabetes mellitus with diabetic nephropathy, without long-term current use of insulin ([address])     Taking medication with no side effects. Exercise: walking Diet diabetic has lost 10  pounds since 2/20/2023 when started ozempic    BS: 's fasting 2 hr postprandial 130-150's. Had a BS in 60's a few times when fasting. Since last visit: has been feeling well.      Current Outpatient Medications   Medication Sig Dispense Refill    ONETOUCH ULTRA strip       COENZYME Q10-VITAMIN E PO Take 2 capsules by mouth daily      Semaglutide,0.25 or 0.5MG/DOS, (OZEMPIC, 0.25 OR 0.5 MG/DOSE,) 2 MG/3ML SOPN INJECT 0.5MG

## 2023-08-23 NOTE — ASSESSMENT & PLAN NOTE
having some low BS episodes and will stop basaglar. check lab work and plan if lab work is good to increase ozempic to 1 mg weekly. tolerating medication well.

## 2023-08-23 NOTE — ASSESSMENT & PLAN NOTE
having some low BS episodes and will stop basaglar. check lab work and plan if lab work is good to increase ozempic to 1 mg weekly to continue with diabetic control and weight loss. Tolerating medication well.

## 2023-08-24 LAB
ALBUMIN SERPL-MCNC: 4.3 G/DL (ref 3.5–5)
ALBUMIN/GLOB SERPL: 1.4 (ref 1.1–2.2)
ALP SERPL-CCNC: 44 U/L (ref 45–117)
ALT SERPL-CCNC: 48 U/L (ref 12–78)
ANION GAP SERPL CALC-SCNC: 6 MMOL/L (ref 5–15)
AST SERPL-CCNC: 14 U/L (ref 15–37)
BASOPHILS # BLD: 0 K/UL (ref 0–0.1)
BASOPHILS NFR BLD: 1 % (ref 0–1)
BILIRUB SERPL-MCNC: 0.5 MG/DL (ref 0.2–1)
BUN SERPL-MCNC: 19 MG/DL (ref 6–20)
BUN/CREAT SERPL: 30 (ref 12–20)
CALCIUM SERPL-MCNC: 9.2 MG/DL (ref 8.5–10.1)
CHLORIDE SERPL-SCNC: 106 MMOL/L (ref 97–108)
CHOLEST SERPL-MCNC: 103 MG/DL
CO2 SERPL-SCNC: 26 MMOL/L (ref 21–32)
CREAT SERPL-MCNC: 0.63 MG/DL (ref 0.55–1.02)
CREAT UR-MCNC: 110 MG/DL
DIFFERENTIAL METHOD BLD: NORMAL
EOSINOPHIL # BLD: 0.3 K/UL (ref 0–0.4)
EOSINOPHIL NFR BLD: 5 % (ref 0–7)
ERYTHROCYTE [DISTWIDTH] IN BLOOD BY AUTOMATED COUNT: 12.8 % (ref 11.5–14.5)
EST. AVERAGE GLUCOSE BLD GHB EST-MCNC: 160 MG/DL
GLOBULIN SER CALC-MCNC: 3 G/DL (ref 2–4)
GLUCOSE SERPL-MCNC: 117 MG/DL (ref 65–100)
HBA1C MFR BLD: 7.2 % (ref 4–5.6)
HCT VFR BLD AUTO: 40.3 % (ref 35–47)
HDLC SERPL-MCNC: 51 MG/DL
HDLC SERPL: 2 (ref 0–5)
HGB BLD-MCNC: 12.7 G/DL (ref 11.5–16)
IMM GRANULOCYTES # BLD AUTO: 0 K/UL (ref 0–0.04)
IMM GRANULOCYTES NFR BLD AUTO: 0 % (ref 0–0.5)
LDLC SERPL CALC-MCNC: 30.8 MG/DL (ref 0–100)
LYMPHOCYTES # BLD: 2.3 K/UL (ref 0.8–3.5)
LYMPHOCYTES NFR BLD: 43 % (ref 12–49)
MCH RBC QN AUTO: 27.7 PG (ref 26–34)
MCHC RBC AUTO-ENTMCNC: 31.5 G/DL (ref 30–36.5)
MCV RBC AUTO: 88 FL (ref 80–99)
MICROALBUMIN UR-MCNC: 4.08 MG/DL
MICROALBUMIN/CREAT UR-RTO: 37 MG/G (ref 0–30)
MONOCYTES # BLD: 0.3 K/UL (ref 0–1)
MONOCYTES NFR BLD: 6 % (ref 5–13)
NEUTS SEG # BLD: 2.4 K/UL (ref 1.8–8)
NEUTS SEG NFR BLD: 45 % (ref 32–75)
NRBC # BLD: 0 K/UL (ref 0–0.01)
NRBC BLD-RTO: 0 PER 100 WBC
PLATELET # BLD AUTO: 264 K/UL (ref 150–400)
PMV BLD AUTO: 9.4 FL (ref 8.9–12.9)
POTASSIUM SERPL-SCNC: 4 MMOL/L (ref 3.5–5.1)
PROT SERPL-MCNC: 7.3 G/DL (ref 6.4–8.2)
RBC # BLD AUTO: 4.58 M/UL (ref 3.8–5.2)
SODIUM SERPL-SCNC: 138 MMOL/L (ref 136–145)
TRIGL SERPL-MCNC: 106 MG/DL
VLDLC SERPL CALC-MCNC: 21.2 MG/DL
WBC # BLD AUTO: 5.3 K/UL (ref 3.6–11)

## 2023-08-30 RX ORDER — METFORMIN HYDROCHLORIDE 500 MG/1
TABLET, EXTENDED RELEASE ORAL
Qty: 360 TABLET | Refills: 1 | Status: SHIPPED | OUTPATIENT
Start: 2023-08-30

## 2023-08-30 RX ORDER — ROSUVASTATIN CALCIUM 40 MG/1
40 TABLET, COATED ORAL NIGHTLY
Qty: 90 TABLET | Refills: 3 | Status: SHIPPED | OUTPATIENT
Start: 2023-08-30

## 2023-08-30 RX ORDER — SEMAGLUTIDE 1.34 MG/ML
1 INJECTION, SOLUTION SUBCUTANEOUS
Qty: 3 ML | Refills: 2 | Status: SHIPPED | OUTPATIENT
Start: 2023-08-30

## 2023-10-03 ENCOUNTER — PATIENT MESSAGE (OUTPATIENT)
Age: 53
End: 2023-10-03

## 2023-10-03 DIAGNOSIS — E11.21 TYPE 2 DIABETES MELLITUS WITH DIABETIC NEPHROPATHY, WITHOUT LONG-TERM CURRENT USE OF INSULIN (HCC): Primary | ICD-10-CM

## 2023-10-05 ENCOUNTER — TELEPHONE (OUTPATIENT)
Age: 53
End: 2023-10-05

## 2023-10-05 NOTE — TELEPHONE ENCOUNTER
No chief complaint on file.     Last Appointment with Dr. Jaquan Stuart 8/23/23    Future Appointments   Date Time Provider [address]   11/14/2023  2:30 PM Lilian Crandall MD [facility] MERON FAYE AMB

## 2023-10-05 NOTE — TELEPHONE ENCOUNTER
From: Laruen Life800  Sent: 10/4/2023 7:14 PM EDT  To: Jocelyn May End  Clinical Staff  Subject: Ozempic 1mg is out of stock in almost all pharmacies I called    Hi,    I was able to connect with my mail order pharmacy and they will reach out for the prescription. I was told that it can take up to 5 days for the medicine to reach me. However I will not have any Ozempic to use this week. What is an option for me to take this week? My last dose was used last week.      Thank you  Mitzi Meadows

## 2023-10-05 NOTE — TELEPHONE ENCOUNTER
Medication Refill Request    Veverly Millie is requesting a refill of the following medication(s):   Ozempic 1mg dose  Please send refill to:   [facility], [address] [place] [address]  [phone]

## 2023-10-06 RX ORDER — SEMAGLUTIDE 1.34 MG/ML
1 INJECTION, SOLUTION SUBCUTANEOUS
Qty: 3 ML | Refills: 2 | Status: SHIPPED | OUTPATIENT
Start: 2023-10-06

## 2023-10-09 ENCOUNTER — TELEPHONE (OUTPATIENT)
Age: 53
End: 2023-10-09

## 2023-10-09 ENCOUNTER — OFFICE VISIT (OUTPATIENT)
Age: 53
End: 2023-10-09
Payer: COMMERCIAL

## 2023-10-09 VITALS
SYSTOLIC BLOOD PRESSURE: 115 MMHG | DIASTOLIC BLOOD PRESSURE: 71 MMHG | HEIGHT: 61 IN | WEIGHT: 159 LBS | BODY MASS INDEX: 30.02 KG/M2 | HEART RATE: 80 BPM

## 2023-10-09 DIAGNOSIS — E11.21 TYPE 2 DIABETES MELLITUS WITH DIABETIC NEPHROPATHY, WITHOUT LONG-TERM CURRENT USE OF INSULIN (HCC): Primary | ICD-10-CM

## 2023-10-09 DIAGNOSIS — E78.2 MIXED HYPERLIPIDEMIA: ICD-10-CM

## 2023-10-09 PROCEDURE — 99214 OFFICE O/P EST MOD 30 MIN: CPT | Performed by: INTERNAL MEDICINE

## 2023-10-09 PROCEDURE — 3051F HG A1C>EQUAL 7.0%<8.0%: CPT | Performed by: INTERNAL MEDICINE

## 2023-10-09 RX ORDER — SEMAGLUTIDE 1.34 MG/ML
1 INJECTION, SOLUTION SUBCUTANEOUS
Qty: 9 ML | Refills: 1 | Status: SHIPPED | OUTPATIENT
Start: 2023-10-09

## 2023-10-09 NOTE — PATIENT INSTRUCTIONS
For now, continue the Metformin 4 pills in the evening. Continue the Ozempic 1.0mg every week. Stay off the [address] for now. It is very important you not snack and it is important you avoid excess carbs and avoid sugars. I want you to test your blood sugar every day, first thing in the morning or before bedtime.

## 2023-10-09 NOTE — TELEPHONE ENCOUNTER
Medication Refill Request    Laina Schwartz is requesting a refill of the following medication(s):   Ozempic 1mg Qty:90  Please send refill to:    [facility], [address] [place] [address]  [phone]

## 2023-10-09 NOTE — PROGRESS NOTES
Foot:   Visual Exam: normal   Pulse DP: 2+ (normal)   Filament test: normal sensation   Vibratory Sensation: normal      Data Reviewed:   Component      Latest Ref Rng 8/23/2023   Sodium      136 - 145 mmol/L 138    Potassium      3.5 - 5.1 mmol/L 4.0    Chloride      97 - 108 mmol/L 106    CO2      21 - 32 mmol/L 26    Anion Gap      5 - 15 mmol/L 6    Glucose, Random      65 - 100 mg/dL 117 (H)    BUN,BUNPL      6 - 20 MG/DL 19    Creatinine      0.55 - 1.02 MG/DL 0.63    Bun/Cre Ratio      12 - 20   30 (H)    Est, Glom Filt Rate      >60 ml/min/1.73m2 >60    CALCIUM, SERUM, 297045      8.5 - 10.1 MG/DL 9.2    BILIRUBIN TOTAL      0.2 - 1.0 MG/DL 0.5    ALT      12 - 78 U/L 48    AST      15 - 37 U/L 14 (L)    Alk Phos      45 - 117 U/L 44 (L)    Total Protein      6.4 - 8.2 g/dL 7.3    Albumin      3.5 - 5.0 g/dL 4.3    Globulin      2.0 - 4.0 g/dL 3.0    ALBUMIN/GLOBULIN RATIO      1.1 - 2.2   1.4    Cholesterol, Total      <200 MG/    Triglycerides      <150 MG/    HDL Cholesterol      MG/DL 51    LDL Calculated      0 - 100 MG/DL 30.8    VLDL Cholesterol Calculated      MG/DL 21.2    Chol/HDL Ratio      0.0 - 5.0   2.0    Microalbumin, Random Urine      MG/DL 4.08    Creatinine, Ur      mg/dL 110.00    Microalb/Creat Ratio POC      0 - 30 mg/g 37 (H)    Hemoglobin A1C      4.0 - 5.6 % 7.2 (H)    eAG (mg/dL)      mg/dL 160       Assessment/Plan:   1) DM >  Pt given copy of \"Daily Diabetes Meal Planning Guide\" and educated about the \"[place] dinner plate\", reading food labels and which foods have the highest carbohydrates. Pt instructed to limit her carbohydrates to 30-45 grams with meals and 15 grams or less with snacks (but to limit snacks). We also discussed the importance of increasing her physical activity to help with improving BGs. We discussed glucotoxicity and how staring insulin is often needed to get very high BGs down and once the BGs improve we can stop the insulin later.    Pt to

## 2023-11-28 RX ORDER — METFORMIN HYDROCHLORIDE 500 MG/1
TABLET, EXTENDED RELEASE ORAL
Qty: 360 TABLET | Refills: 1 | Status: SHIPPED | OUTPATIENT
Start: 2023-11-28

## 2023-11-29 ENCOUNTER — TELEMEDICINE (OUTPATIENT)
Age: 53
End: 2023-11-29
Payer: COMMERCIAL

## 2023-11-29 DIAGNOSIS — E66.09 CLASS 1 OBESITY DUE TO EXCESS CALORIES WITH SERIOUS COMORBIDITY AND BODY MASS INDEX (BMI) OF 30.0 TO 30.9 IN ADULT: ICD-10-CM

## 2023-11-29 DIAGNOSIS — E11.21 TYPE 2 DIABETES MELLITUS WITH DIABETIC NEPHROPATHY, WITHOUT LONG-TERM CURRENT USE OF INSULIN (HCC): Primary | ICD-10-CM

## 2023-11-29 DIAGNOSIS — E78.00 PURE HYPERCHOLESTEROLEMIA: ICD-10-CM

## 2023-11-29 PROBLEM — E66.811 CLASS 1 OBESITY DUE TO EXCESS CALORIES WITH SERIOUS COMORBIDITY AND BODY MASS INDEX (BMI) OF 30.0 TO 30.9 IN ADULT: Status: ACTIVE | Noted: 2023-11-29

## 2023-11-29 PROCEDURE — 99214 OFFICE O/P EST MOD 30 MIN: CPT | Performed by: INTERNAL MEDICINE

## 2023-11-29 PROCEDURE — 3051F HG A1C>EQUAL 7.0%<8.0%: CPT | Performed by: INTERNAL MEDICINE

## 2023-11-29 ASSESSMENT — PATIENT HEALTH QUESTIONNAIRE - PHQ9
SUM OF ALL RESPONSES TO PHQ QUESTIONS 1-9: 0
SUM OF ALL RESPONSES TO PHQ QUESTIONS 1-9: 0
SUM OF ALL RESPONSES TO PHQ9 QUESTIONS 1 & 2: 0
SUM OF ALL RESPONSES TO PHQ QUESTIONS 1-9: 0
SUM OF ALL RESPONSES TO PHQ QUESTIONS 1-9: 0
2. FEELING DOWN, DEPRESSED OR HOPELESS: 0
1. LITTLE INTEREST OR PLEASURE IN DOING THINGS: 0

## 2023-11-29 ASSESSMENT — ENCOUNTER SYMPTOMS
SHORTNESS OF BREATH: 0
ABDOMINAL PAIN: 0

## 2023-11-29 NOTE — ASSESSMENT & PLAN NOTE
Unclear control, continue current medications   Recheck hemoglobin A1c. Counseled regarding importance of checking her blood sugars regularly and would like her to follow-up in 6 months. She is also seeing endocrine Dr Montse Longoria.

## 2023-11-29 NOTE — PROGRESS NOTES
Rafael Conner is a 48 y.o. female who was seen by synchronous (real-time) audio-video technology on 11/29/2023. Assessment & Plan:   Below is the assessment and plan developed based on review of pertinent history, physical exam (if applicable), labs, studies, and medications. 1. Type 2 diabetes mellitus with diabetic nephropathy, without long-term current use of insulin ([facility])  Assessment & Plan:   Unclear control, continue current medications   Recheck hemoglobin A1c. Counseled regarding importance of checking her blood sugars regularly and would like her to follow-up in 6 months. She is also seeing endocrine Dr Shad Boyer. Orders:  -     Comprehensive Metabolic Panel; Future  -     Hemoglobin A1C; Future  2. Class 1 obesity due to excess calories with serious comorbidity and body mass index (BMI) of 30.0 to 30.9 in adult  Assessment & Plan:   Counseled regarding continue with weight loss strategies and monitor for weight loss on Ozempic. 3. Pure hypercholesterolemia  Assessment & Plan:   Well-controlled, continue current medications     Follow-up 6 months. Subjective: Rafael Conner was seen for Follow-up and Diabetes  Taking medication with no side effects. Exercise: walking Diet diabetic   Since starting ozempic she thinks she has lost 2 pounds. BS: has not been check for the past 2 weeks  She has no complaints today. Since last visit: has not had any problems. Prior to Admission medications    Medication Sig Start Date End Date Taking?  Authorizing Provider   metFORMIN (GLUCOPHAGE-XR) 500 MG extended release tablet TAKE 4 TABLETS BY MOUTH DAILY WITH DINNER 11/28/23  Yes Anders Rodriguez MD   Semaglutide, 1 MG/DOSE, (OZEMPIC, 1 MG/DOSE,) 4 MG/3ML SOPN Inject 1 mg into the skin every 7 days 10/9/23  Yes Felton Sanchez MD   rosuvastatin (CRESTOR) 40 MG tablet Take 1 tablet by mouth nightly 8/30/23  Yes Anders Rodriguez MD   [facility] ULTRA strip  6/9/23  Yes Provider, MD Aroldo

## 2023-12-11 DIAGNOSIS — E78.00 PURE HYPERCHOLESTEROLEMIA: Primary | ICD-10-CM

## 2023-12-11 RX ORDER — ROSUVASTATIN CALCIUM 40 MG/1
40 TABLET, COATED ORAL NIGHTLY
Qty: 90 TABLET | Refills: 3 | Status: CANCELLED | OUTPATIENT
Start: 2023-12-11

## 2023-12-11 RX ORDER — SEMAGLUTIDE 1.34 MG/ML
1 INJECTION, SOLUTION SUBCUTANEOUS
Qty: 3 ML | Refills: 0 | Status: SHIPPED | OUTPATIENT
Start: 2023-12-11

## 2023-12-11 RX ORDER — ROSUVASTATIN CALCIUM 40 MG/1
40 TABLET, COATED ORAL NIGHTLY
Qty: 90 TABLET | Refills: 3 | Status: SHIPPED | OUTPATIENT
Start: 2023-12-11

## 2023-12-28 ENCOUNTER — TELEPHONE (OUTPATIENT)
Age: 53
End: 2023-12-28

## 2023-12-28 NOTE — TELEPHONE ENCOUNTER
----- Message from Roya Melendez sent at 12/28/2023  1:38 PM EST -----  Regarding: Ozempic 1mg Prescription  Contact: [phone]  Hi Dr Alcala,    Thank you for the prescription refill for my medication.    I need to request a new prescription refill for Ozempic 1mg for a 90 day supply instead? I only got a 1 month supply and would to do the 90 day as it is more cost effective for me co-pay [place].     Thank you  Jailyn Melendez

## 2024-01-11 RX ORDER — SEMAGLUTIDE 1.34 MG/ML
1 INJECTION, SOLUTION SUBCUTANEOUS
Qty: 9 ML | Refills: 1 | Status: SHIPPED | OUTPATIENT
Start: 2024-01-11

## 2024-01-16 RX ORDER — SEMAGLUTIDE 1.34 MG/ML
1 INJECTION, SOLUTION SUBCUTANEOUS
Qty: 9 ML | Refills: 1 | Status: SHIPPED | OUTPATIENT
Start: 2024-01-16

## 2024-01-26 ENCOUNTER — OFFICE VISIT (OUTPATIENT)
Age: 54
End: 2024-01-26

## 2024-01-26 VITALS
HEART RATE: 96 BPM | RESPIRATION RATE: 16 BRPM | BODY MASS INDEX: 30.62 KG/M2 | TEMPERATURE: 97.5 F | OXYGEN SATURATION: 98 % | DIASTOLIC BLOOD PRESSURE: 82 MMHG | WEIGHT: 162.2 LBS | HEIGHT: 61 IN | SYSTOLIC BLOOD PRESSURE: 119 MMHG

## 2024-01-26 DIAGNOSIS — N30.00 ACUTE CYSTITIS WITHOUT HEMATURIA: ICD-10-CM

## 2024-01-26 DIAGNOSIS — R30.0 DYSURIA: Primary | ICD-10-CM

## 2024-01-26 DIAGNOSIS — R82.90 ABNORMAL URINALYSIS: ICD-10-CM

## 2024-01-26 LAB
BILIRUBIN, URINE, POC: NEGATIVE
BLOOD URINE, POC: NORMAL
GLUCOSE URINE, POC: NEGATIVE
KETONES, URINE, POC: NEGATIVE
LEUKOCYTE ESTERASE, URINE, POC: NORMAL
NITRITE, URINE, POC: NEGATIVE
PH, URINE, POC: 6 (ref 4.6–8)
PROTEIN,URINE, POC: NORMAL
SPECIFIC GRAVITY, URINE, POC: 1.01 (ref 1–1.03)
URINALYSIS CLARITY, POC: NORMAL
URINALYSIS COLOR, POC: NORMAL
UROBILINOGEN, POC: NORMAL

## 2024-01-26 RX ORDER — NITROFURANTOIN 25; 75 MG/1; MG/1
100 CAPSULE ORAL 2 TIMES DAILY
Qty: 10 CAPSULE | Refills: 0 | Status: SHIPPED | OUTPATIENT
Start: 2024-01-26 | End: 2024-01-31

## 2024-01-26 ASSESSMENT — PATIENT HEALTH QUESTIONNAIRE - PHQ9
SUM OF ALL RESPONSES TO PHQ9 QUESTIONS 1 & 2: 0
SUM OF ALL RESPONSES TO PHQ QUESTIONS 1-9: 0
SUM OF ALL RESPONSES TO PHQ QUESTIONS 1-9: 0
1. LITTLE INTEREST OR PLEASURE IN DOING THINGS: 0
SUM OF ALL RESPONSES TO PHQ QUESTIONS 1-9: 0
SUM OF ALL RESPONSES TO PHQ QUESTIONS 1-9: 0
2. FEELING DOWN, DEPRESSED OR HOPELESS: 0

## 2024-01-26 NOTE — PROGRESS NOTES
Roya Melendez (:  1970) is a 54 y.o. female,here for evaluation of the following chief complaint(s):  Urinary Tract Infection  /82   Pulse 96   Temp 97.5 °F (36.4 °C) (Temporal)   Resp 16   Ht 1.549 m (5' 1\")   Wt 73.6 kg (162 lb 3.2 oz)   SpO2 98%   BMI 30.65 kg/m²         SUBJECTIVE/OBJECTIVE:    HPI:Patient reports dysuria started 3 days ago. She notes increased urinary frequency yesterday with suprapubic pressure. No hematuria. Her blood sugars have been running around 200 this week.    Review of Systems   Genitourinary:  Positive for dysuria and frequency.       Physical Exam  Constitutional:       Appearance: Normal appearance.   Abdominal:      Tenderness: There is no left CVA tenderness.   Skin:     General: Skin is warm and dry.   Neurological:      General: No focal deficit present.      Mental Status: She is alert and oriented to person, place, and time.   Psychiatric:         Mood and Affect: Mood normal.         Behavior: Behavior normal.        Results for orders placed or performed in visit on 24   AMB POC URINALYSIS DIP STICK AUTO W/ MICRO   Result Value Ref Range    Color (UA POC) Light Yellow     Clarity (UA POC) Cloudy     Glucose, Urine, POC Negative     Bilirubin, Urine, POC Negative     Ketones, Urine, POC Negative     Specific Gravity, Urine, POC 1.010 1.001 - 1.035    Blood (UA POC) 1+     pH, Urine, POC 6.0 4.6 - 8.0    Protein, Urine, POC 1+     Urobilinogen, POC 0.2 mg/dL     Nitrite, Urine, POC Negative     Leukocyte Esterase, Urine, POC Trace        ASSESSMENT/PLAN:  1. Dysuria  -     AMB POC URINALYSIS DIP STICK AUTO W/ MICRO  2. Acute cystitis without hematuria  3. Abnormal urinalysis  -     Culture, Urine; Future  Urine culture ordered  Hydrate  Antibiotic as prescribed  Monitor blood sugars  Get labs drawn that were previously ordered by Dr. Franklin    --Roge Tadeo, DELFIN - NP

## 2024-01-27 LAB
BACTERIA SPEC CULT: NORMAL
CC UR VC: NORMAL
SERVICE CMNT-IMP: NORMAL

## 2024-02-07 LAB
BACTERIA SPEC CULT: NORMAL
CC UR VC: NORMAL
SERVICE CMNT-IMP: NORMAL

## 2024-03-08 ENCOUNTER — OFFICE VISIT (OUTPATIENT)
Age: 54
End: 2024-03-08

## 2024-03-08 VITALS
RESPIRATION RATE: 18 BRPM | SYSTOLIC BLOOD PRESSURE: 150 MMHG | WEIGHT: 162.26 LBS | HEART RATE: 78 BPM | DIASTOLIC BLOOD PRESSURE: 95 MMHG | BODY MASS INDEX: 30.66 KG/M2 | OXYGEN SATURATION: 97 % | TEMPERATURE: 98.1 F

## 2024-03-08 DIAGNOSIS — J06.9 VIRAL UPPER RESPIRATORY ILLNESS: Primary | ICD-10-CM

## 2024-03-08 LAB
GROUP A STREP ANTIGEN, POC: NEGATIVE
INFLUENZA A ANTIGEN, POC: NEGATIVE
INFLUENZA B ANTIGEN, POC: NEGATIVE
VALID INTERNAL CONTROL, POC: NORMAL
VALID INTERNAL CONTROL, POC: NORMAL

## 2024-03-08 NOTE — PROGRESS NOTES
Subjective: (As above and below)     The patient/guardian gave verbal consent to treat.        Chief Complaint   Patient presents with    Pharyngitis     Sore throat and cough, daughter was previously sick. OTC tylenol for severe cold. Covid negative, and body aches and chills.      HPI  Roya Melendez is a 54 y.o. female who presents for evaluation of : sore throat, nasal congestion, cough. Symptom onset past week. Daughter also sick with similar but is feeling better . Preceding illness: none.  No other identified aggravating or alleviating factors. Symptoms are constant and overall improving gradually. Denies: SOB, vomiting/diarrhea, rashes, fevers .          Review of Systems    Review of Systems - negative except as listed above    Reviewed PmHx, RxHx, FmHx, SocHx, AllgHx and updated in chart.  Family History   Problem Relation Age of Onset    Hypertension Paternal Grandfather     No Known Problems Paternal Grandmother     No Known Problems Maternal Grandmother     No Known Problems Brother     Diabetes Paternal Aunt     Stroke Father     Diabetes Father     Hypertension Father     Cancer Mother         lung    Cancer Maternal Grandfather         lung       Past Medical History:   Diagnosis Date    Diabetes mellitus (HCC)     Hyperlipidemia 12/9/2015    Type 2 diabetes mellitus with diabetic nephropathy, without long-term current use of insulin (HCC) 2/22/2023      Social History     Socioeconomic History    Marital status:      Spouse name: None    Number of children: None    Years of education: None    Highest education level: None   Tobacco Use    Smoking status: Never     Passive exposure: Never    Smokeless tobacco: Never   Substance and Sexual Activity    Alcohol use: Yes    Drug use: No     Social Determinants of Health     Financial Resource Strain: Low Risk  (8/23/2023)    Overall Financial Resource Strain (CARDIA)     Difficulty of Paying Living Expenses: Not hard at all   Transportation

## 2024-03-22 ENCOUNTER — OFFICE VISIT (OUTPATIENT)
Age: 54
End: 2024-03-22

## 2024-03-22 VITALS
TEMPERATURE: 98.9 F | HEART RATE: 85 BPM | DIASTOLIC BLOOD PRESSURE: 84 MMHG | BODY MASS INDEX: 30.23 KG/M2 | SYSTOLIC BLOOD PRESSURE: 125 MMHG | OXYGEN SATURATION: 97 % | WEIGHT: 160 LBS | RESPIRATION RATE: 18 BRPM

## 2024-03-22 DIAGNOSIS — R05.3 PERSISTENT COUGH: Primary | ICD-10-CM

## 2024-03-22 RX ORDER — DEXTROMETHORPHAN HYDROBROMIDE AND PROMETHAZINE HYDROCHLORIDE 15; 6.25 MG/5ML; MG/5ML
5 SYRUP ORAL 4 TIMES DAILY PRN
Qty: 118 ML | Refills: 0 | Status: SHIPPED | OUTPATIENT
Start: 2024-03-22 | End: 2024-03-29

## 2024-03-22 ASSESSMENT — ENCOUNTER SYMPTOMS
SORE THROAT: 1
COUGH: 1
RHINORRHEA: 1

## 2024-03-22 NOTE — PROGRESS NOTES
Transportation     Lack of Transportation (Non-Medical): No   Housing Stability: Unknown (8/23/2023)    Housing Stability Vital Sign     Unstable Housing in the Last Year: No          Current Outpatient Medications   Medication Sig    Semaglutide, 1 MG/DOSE, (OZEMPIC, 1 MG/DOSE,) 4 MG/3ML SOPN Inject 1 mg into the skin every 7 days    rosuvastatin (CRESTOR) 40 MG tablet Take 1 tablet by mouth nightly    metFORMIN (GLUCOPHAGE-XR) 500 MG extended release tablet TAKE 4 TABLETS BY MOUTH DAILY WITH DINNER    ONETOUCH ULTRA strip     COENZYME Q10-VITAMIN E PO Take 2 capsules by mouth daily    BD PEN NEEDLE LIZABETH 2ND GEN 32G X 4 MM MISC Use once daily on insulin pen. NEEDS OFFICE VISIT.    acetaminophen (TYLENOL) 325 MG tablet Take by mouth every 4 hours as needed    Cetirizine HCl 10 MG CAPS Take 1 capsule by mouth daily as needed    fluticasone (FLONASE) 50 MCG/ACT nasal spray 2 sprays by Nasal route daily    Naproxen Sodium 220 MG CAPS Take 1 capsule by mouth daily as needed     No current facility-administered medications for this visit.       Objective:     Vitals:    03/22/24 1803   BP: 125/84   Site: Left Upper Arm   Position: Sitting   Cuff Size: Medium Adult   Pulse: 85   Resp: 18   Temp: 98.9 °F (37.2 °C)   TempSrc: Oral   SpO2: 97%   Weight: 72.6 kg (160 lb)       Physical Exam  Constitutional:       Appearance: Normal appearance.   HENT:      Right Ear: Tympanic membrane normal.      Left Ear: Tympanic membrane normal.      Nose: Nose normal.      Mouth/Throat:      Mouth: Mucous membranes are moist.      Pharynx: Oropharynx is clear.   Cardiovascular:      Rate and Rhythm: Normal rate and regular rhythm.      Heart sounds: Normal heart sounds.   Pulmonary:      Effort: Pulmonary effort is normal.      Breath sounds: Normal breath sounds.   Neurological:      Mental Status: She is alert.            Assessment/ Plan:     1. Persistent cough  The following orders have not been finalized:  -

## 2024-03-25 ENCOUNTER — TELEPHONE (OUTPATIENT)
Age: 54
End: 2024-03-25

## 2024-03-25 NOTE — TELEPHONE ENCOUNTER
Patient report seen by urgent care on Friday. Prescribed medication. C/o cough, post nasal drip. Patient is having pain in rib due to coughing. Would like another medication prescribed to control cough. Scheduled VV with Dr. Randolph

## 2024-03-25 NOTE — TELEPHONE ENCOUNTER
Reason for call:  Spoke with pt.   Pt requested a call back from nurse.  Per pt she was at the urgent care on Friday with a really bad cough.  Pt requested an rodney for this afternoon. We do not have any  rodney available until Wed 27.  Offer an rodney for wed pt declined rodney.   Pt would like stronger medication for her cough.         Is this a new problem: Yes    Date of last appointment:  1/26/2024     Can we respond via Film[employer]: No    Best call back number: Roya Melendez   [phone]

## 2024-03-26 ENCOUNTER — TELEMEDICINE (OUTPATIENT)
Age: 54
End: 2024-03-26
Payer: COMMERCIAL

## 2024-03-26 DIAGNOSIS — R05.1 ACUTE COUGH: Primary | ICD-10-CM

## 2024-03-26 PROCEDURE — 99213 OFFICE O/P EST LOW 20 MIN: CPT | Performed by: INTERNAL MEDICINE

## 2024-03-26 RX ORDER — CODEINE PHOSPHATE/GUAIFENESIN 10-100MG/5
5 LIQUID (ML) ORAL NIGHTLY PRN
Qty: 35 ML | Refills: 0 | Status: SHIPPED | OUTPATIENT
Start: 2024-03-26 | End: 2024-04-02

## 2024-03-26 ASSESSMENT — ENCOUNTER SYMPTOMS
SHORTNESS OF BREATH: 0
COUGH: 1
ABDOMINAL PAIN: 0
SINUS PAIN: 0
WHEEZING: 0

## 2024-03-26 NOTE — PROGRESS NOTES
3/26/2024    Roya Melendez 1970 is a 54 y.o. year old female established patient,   here for video visit to evaluate the following chief complaint(s):  Chief Complaint   Patient presents with    Cough           ASSESSMENT/PLAN:  Below is the assessment and plan developed based on review of pertinent history, physical exam, labs, studies, and medications.    1. Acute cough  -     guaiFENesin-codeine (TUSSI-ORGANIDIN NR) 100-10 MG/5ML syrup; Take 5 mLs by mouth nightly as needed for Cough for up to 7 days. Max Daily Amount: 5 mLs, Disp-35 mL, R-0Normal     Symptoms overall improving from recent suspected viral illness but cough is interfering with sleep  Rec resuming flonase and add mucinex DM am, will try codeine cough syrup  New med dosing and potential side effects discussed     Return if symptoms worsen or fail to improve.       SUBJECTIVE/OBJECTIVE:  Pt seen at urgent care 3/8 for pharyngitis  COVID, strep, flu negative, they recommended supportive care  Seen at urgent care 3/22 for PND, persistent cough going on 5 days  Rec supportive care, Rx cough syrup but it hasn't been that helpful  She has post nasal drip that contributes to a lot of coughing  Not able to sleep through the night  She is doing sinus rinse      Review of Systems   Constitutional:  Positive for fatigue. Negative for chills and fever.   HENT:  Positive for postnasal drip. Negative for sinus pain.    Respiratory:  Positive for cough. Negative for shortness of breath and wheezing.    Cardiovascular:  Negative for chest pain, palpitations and leg swelling.   Gastrointestinal:  Negative for abdominal pain.   Musculoskeletal:  Negative for myalgias.   Skin:  Negative for rash.            Constitutional: [x] Appears well-developed and well-nourished [x] No apparent distress      [] Abnormal -     Mental status: [x] Alert and awake  [x] Oriented to person/place/time [x] Able to follow commands    [] Abnormal -     Eyes:   EOM    [x]  Normal

## 2024-03-27 RX ORDER — BLOOD SUGAR DIAGNOSTIC
STRIP MISCELLANEOUS
Qty: 400 STRIP | Refills: 1 | Status: SHIPPED | OUTPATIENT
Start: 2024-03-27

## 2024-05-13 ENCOUNTER — OFFICE VISIT (OUTPATIENT)
Age: 54
End: 2024-05-13
Payer: COMMERCIAL

## 2024-05-13 VITALS
DIASTOLIC BLOOD PRESSURE: 75 MMHG | WEIGHT: 160 LBS | SYSTOLIC BLOOD PRESSURE: 118 MMHG | HEART RATE: 82 BPM | BODY MASS INDEX: 30.21 KG/M2 | HEIGHT: 61 IN

## 2024-05-13 DIAGNOSIS — E78.2 MIXED HYPERLIPIDEMIA: ICD-10-CM

## 2024-05-13 DIAGNOSIS — E11.21 TYPE 2 DIABETES MELLITUS WITH DIABETIC NEPHROPATHY, WITHOUT LONG-TERM CURRENT USE OF INSULIN (HCC): Primary | ICD-10-CM

## 2024-05-13 LAB — HBA1C MFR BLD: 11.2 %

## 2024-05-13 PROCEDURE — 99214 OFFICE O/P EST MOD 30 MIN: CPT | Performed by: INTERNAL MEDICINE

## 2024-05-13 PROCEDURE — 83036 HEMOGLOBIN GLYCOSYLATED A1C: CPT | Performed by: INTERNAL MEDICINE

## 2024-05-13 RX ORDER — INSULIN GLARGINE 100 [IU]/ML
20 INJECTION, SOLUTION SUBCUTANEOUS NIGHTLY
Qty: 15 ML | Refills: 0 | Status: SHIPPED | OUTPATIENT
Start: 2024-05-13

## 2024-05-13 RX ORDER — DAPAGLIFLOZIN 10 MG/1
10 TABLET, FILM COATED ORAL EVERY MORNING
Qty: 30 TABLET | Refills: 0 | Status: SHIPPED | OUTPATIENT
Start: 2024-05-13

## 2024-05-13 RX ORDER — METFORMIN HYDROCHLORIDE 500 MG/1
TABLET, EXTENDED RELEASE ORAL
Qty: 360 TABLET | Refills: 1 | Status: SHIPPED | OUTPATIENT
Start: 2024-05-13

## 2024-05-13 RX ORDER — PEN NEEDLE, DIABETIC 32GX 5/32"
NEEDLE, DISPOSABLE MISCELLANEOUS
Qty: 100 EACH | Refills: 0 | Status: SHIPPED | OUTPATIENT
Start: 2024-05-13

## 2024-05-13 RX ORDER — SEMAGLUTIDE 1.34 MG/ML
1 INJECTION, SOLUTION SUBCUTANEOUS
Qty: 9 ML | Refills: 1 | Status: SHIPPED | OUTPATIENT
Start: 2024-05-13

## 2024-05-13 NOTE — PROGRESS NOTES
Cardiovascular: no chest pain  - Respiratory: no shortness of breath  - Musculoskeletal: no myalgias  - Neurological: no numbness/tingling in extremities    Physical Examination:  Blood pressure 118/75, pulse 82, height 1.549 m (5' 1\"), weight 72.6 kg (160 lb).  General: pleasant, no distress, good eye contact   Neck: no carotid bruits  Cardiovascular: regular, normal rate, nl s1 and s2, no m/r/g, 2+ DP pulses   Respiratory: clear bilaterally  Integumentary: no edema, no foot ulcers,   Psychiatric: normal mood and affect    Diabetic foot exam:   Left Foot:   Visual Exam: normal   Pulse DP: 2+ (normal)   Filament test: normal sensation   Vibratory Sensation: normal  Right Foot:   Visual Exam: normal   Pulse DP: 2+ (normal)   Filament test: normal sensation   Vibratory Sensation: normal      Data Reviewed:   Her A1C today was 11.2%.    Assessment/Plan:   1) DM >  Her A1C today was 11.2%. I will restart her on Basaglar 20 units daily, and start her on Farxiga 10mg daily. Since she has proteinuria the Farxiga will help with blood sugars as well as renal protection.  Pt to continue the Metformin XR 2000mg daily and Ozempic 1.0mg weekly.  Pt to test her BGs daily and send me her BG readings in 6 weeks.  She has already developed proteinuria as a side effect of the DM.     2) HLD > Her lipid panel was at goal in August 2023. Pt to continue the Rosuvastatin 40mg daily. I will order a lipid panel and CMP.    Pt voices understanding and agreement with the plan.    RTC 4 months    Copy sent to:  Dr. Yudith Franklin

## 2024-05-13 NOTE — PATIENT INSTRUCTIONS
1) Basaglar (insulin) Take 20 units every day.    2) I am starting a pill called Farxiga. It is a 10mg pill you take once every morning.    3) Continue the Ozempic 1.0mg every week and the Metformin 4 pills every night.     Undermining Type: Entire Wound

## 2024-05-14 LAB
ALBUMIN SERPL-MCNC: 4.9 G/DL (ref 3.8–4.9)
ALBUMIN/CREAT UR: 420 MG/G CREAT (ref 0–29)
ALBUMIN/GLOB SERPL: 1.9 {RATIO} (ref 1.2–2.2)
ALP SERPL-CCNC: 109 IU/L (ref 44–121)
ALT SERPL-CCNC: 60 IU/L (ref 0–32)
AST SERPL-CCNC: 28 IU/L (ref 0–40)
BILIRUB SERPL-MCNC: 0.7 MG/DL (ref 0–1.2)
BUN SERPL-MCNC: 18 MG/DL (ref 6–24)
BUN/CREAT SERPL: 25 (ref 9–23)
CALCIUM SERPL-MCNC: 9.5 MG/DL (ref 8.7–10.2)
CHLORIDE SERPL-SCNC: 97 MMOL/L (ref 96–106)
CHOLEST SERPL-MCNC: 97 MG/DL (ref 100–199)
CO2 SERPL-SCNC: 20 MMOL/L (ref 20–29)
CREAT SERPL-MCNC: 0.72 MG/DL (ref 0.57–1)
CREAT UR-MCNC: 123.8 MG/DL
EGFRCR SERPLBLD CKD-EPI 2021: 101 ML/MIN/1.73
GLOBULIN SER CALC-MCNC: 2.6 G/DL (ref 1.5–4.5)
GLUCOSE SERPL-MCNC: 227 MG/DL (ref 70–99)
HDLC SERPL-MCNC: 37 MG/DL
IMP & REVIEW OF LAB RESULTS: NORMAL
LDLC SERPL CALC-MCNC: 33 MG/DL (ref 0–99)
LDLC SERPL DIRECT ASSAY-MCNC: 31 MG/DL (ref 0–99)
Lab: NORMAL
MICROALBUMIN UR-MCNC: 519.4 UG/ML
POTASSIUM SERPL-SCNC: 4.1 MMOL/L (ref 3.5–5.2)
PROT SERPL-MCNC: 7.5 G/DL (ref 6–8.5)
SODIUM SERPL-SCNC: 135 MMOL/L (ref 134–144)
TRIGL SERPL-MCNC: 161 MG/DL (ref 0–149)
VLDLC SERPL CALC-MCNC: 27 MG/DL (ref 5–40)

## 2024-06-04 RX ORDER — DAPAGLIFLOZIN 10 MG/1
10 TABLET, FILM COATED ORAL EVERY MORNING
Qty: 90 TABLET | Refills: 3 | Status: SHIPPED | OUTPATIENT
Start: 2024-06-04

## 2024-06-04 RX ORDER — INSULIN GLARGINE 100 [IU]/ML
20 INJECTION, SOLUTION SUBCUTANEOUS NIGHTLY
Qty: 30 ML | Refills: 1 | Status: SHIPPED | OUTPATIENT
Start: 2024-06-04

## 2024-06-14 RX ORDER — DAPAGLIFLOZIN 10 MG/1
10 TABLET, FILM COATED ORAL EVERY MORNING
Qty: 30 TABLET | Refills: 3 | Status: SHIPPED | OUTPATIENT
Start: 2024-06-14

## 2024-06-18 NOTE — TELEPHONE ENCOUNTER
Chief Complaint   Patient presents with    Medication Refill     Last Appointment with Dr. Franklin:  11/29/2023   Future Appointments   Date Time Provider Department Center   9/18/2024  9:10 AM Gera Alcala MD [facility]

## 2024-06-19 RX ORDER — METFORMIN HYDROCHLORIDE 500 MG/1
TABLET, EXTENDED RELEASE ORAL
Qty: 360 TABLET | Refills: 1 | OUTPATIENT
Start: 2024-06-19

## 2024-07-06 RX ORDER — PEN NEEDLE, DIABETIC 32GX 5/32"
NEEDLE, DISPOSABLE MISCELLANEOUS
Qty: 100 EACH | Refills: 0 | Status: SHIPPED | OUTPATIENT
Start: 2024-07-06

## 2024-07-23 RX ORDER — INSULIN GLARGINE 100 [IU]/ML
INJECTION, SOLUTION SUBCUTANEOUS
Qty: 30 ML | Refills: 1 | Status: SHIPPED | OUTPATIENT
Start: 2024-07-23

## 2024-09-18 ENCOUNTER — OFFICE VISIT (OUTPATIENT)
Age: 54
End: 2024-09-18
Payer: COMMERCIAL

## 2024-09-18 VITALS
SYSTOLIC BLOOD PRESSURE: 123 MMHG | BODY MASS INDEX: 29.6 KG/M2 | HEIGHT: 61 IN | HEART RATE: 82 BPM | DIASTOLIC BLOOD PRESSURE: 69 MMHG | WEIGHT: 156.8 LBS

## 2024-09-18 DIAGNOSIS — E78.2 MIXED HYPERLIPIDEMIA: ICD-10-CM

## 2024-09-18 DIAGNOSIS — E11.21 TYPE 2 DIABETES MELLITUS WITH DIABETIC NEPHROPATHY, WITHOUT LONG-TERM CURRENT USE OF INSULIN (HCC): Primary | ICD-10-CM

## 2024-09-18 LAB — HBA1C MFR BLD: 8.3 %

## 2024-09-18 PROCEDURE — 83036 HEMOGLOBIN GLYCOSYLATED A1C: CPT | Performed by: INTERNAL MEDICINE

## 2024-09-18 PROCEDURE — 99214 OFFICE O/P EST MOD 30 MIN: CPT | Performed by: INTERNAL MEDICINE

## 2024-09-18 RX ORDER — BLOOD SUGAR DIAGNOSTIC
STRIP MISCELLANEOUS
Qty: 200 STRIP | Refills: 3 | Status: SHIPPED | OUTPATIENT
Start: 2024-09-18

## 2024-09-20 RX ORDER — SEMAGLUTIDE 1.34 MG/ML
INJECTION, SOLUTION SUBCUTANEOUS
Qty: 9 ML | Refills: 1 | Status: SHIPPED | OUTPATIENT
Start: 2024-09-20

## 2024-09-20 RX ORDER — INSULIN GLARGINE 100 [IU]/ML
20 INJECTION, SOLUTION SUBCUTANEOUS NIGHTLY
Qty: 30 ML | Refills: 1 | Status: SHIPPED | OUTPATIENT
Start: 2024-09-20

## 2024-09-20 RX ORDER — METFORMIN HCL 500 MG
TABLET, EXTENDED RELEASE 24 HR ORAL
Qty: 360 TABLET | Refills: 1 | Status: SHIPPED | OUTPATIENT
Start: 2024-09-20

## 2024-10-30 ENCOUNTER — TELEPHONE (OUTPATIENT)
Age: 54
End: 2024-10-30

## 2024-11-14 DIAGNOSIS — E78.00 PURE HYPERCHOLESTEROLEMIA: ICD-10-CM

## 2024-11-15 NOTE — TELEPHONE ENCOUNTER
Chief Complaint   Patient presents with    Medication Refill     Last Appointment with Dr. Yudith Franklin:  11/29/2023   Future Appointments   Date Time Provider Department Center   1/21/2025  8:50 AM Gera Alcala MD [facility]

## 2024-11-18 RX ORDER — PEN NEEDLE, DIABETIC 32GX 5/32"
NEEDLE, DISPOSABLE MISCELLANEOUS
Qty: 100 EACH | Refills: 1 | Status: SHIPPED | OUTPATIENT
Start: 2024-11-18 | End: 2024-11-18 | Stop reason: SDUPTHER

## 2024-11-18 RX ORDER — PEN NEEDLE, DIABETIC 32GX 5/32"
NEEDLE, DISPOSABLE MISCELLANEOUS
Qty: 100 EACH | Refills: 1 | Status: SHIPPED | OUTPATIENT
Start: 2024-11-18

## 2024-11-19 RX ORDER — ROSUVASTATIN CALCIUM 40 MG/1
40 TABLET, COATED ORAL NIGHTLY
Qty: 90 TABLET | Refills: 0 | Status: SHIPPED | OUTPATIENT
Start: 2024-11-19

## 2024-11-19 NOTE — TELEPHONE ENCOUNTER
LVM for pt to call office to schedule follow up appt on her cholesterol medication. Also advised needs to schedule appt for CPE.

## 2024-12-10 ENCOUNTER — OFFICE VISIT (OUTPATIENT)
Age: 54
End: 2024-12-10

## 2024-12-10 VITALS
HEART RATE: 91 BPM | WEIGHT: 162 LBS | SYSTOLIC BLOOD PRESSURE: 132 MMHG | BODY MASS INDEX: 30.61 KG/M2 | OXYGEN SATURATION: 97 % | TEMPERATURE: 98.5 F | DIASTOLIC BLOOD PRESSURE: 80 MMHG | RESPIRATION RATE: 16 BRPM

## 2024-12-10 DIAGNOSIS — J06.9 VIRAL URI WITH COUGH: Primary | ICD-10-CM

## 2024-12-10 DIAGNOSIS — R05.1 ACUTE COUGH: ICD-10-CM

## 2024-12-10 NOTE — PROGRESS NOTES
Subjective: (As above and below)     The patient/guardian gave verbal consent to treat.        Chief Complaint   Patient presents with    Cough     Pt present for coughing and chest congestion x 3 days wants to r/o pneumonia        Roya Melendez is a 54 y.o. female who presents for evaluation of :  diagnosed with pneumonia patient has cough and congestion worried about pneumonia wants to rule out pneumonia requesting chest x-ray done today.  Denies fever    HPI      Review of Systems    Review of Systems - negative except as listed above    Reviewed PmHx, RxHx, FmHx, SocHx, AllgHx and updated in chart.  Family History   Problem Relation Age of Onset    Hypertension Paternal Grandfather     No Known Problems Paternal Grandmother     No Known Problems Maternal Grandmother     No Known Problems Brother     Diabetes Paternal Aunt     Stroke Father     Diabetes Father     Hypertension Father     Cancer Mother         lung    Cancer Maternal Grandfather         lung       Past Medical History:   Diagnosis Date    Diabetes mellitus (HCC)     Hyperlipidemia 12/9/2015    Type 2 diabetes mellitus with diabetic nephropathy, without long-term current use of insulin (HCC) 2/22/2023      Social History     Socioeconomic History    Marital status:      Spouse name: None    Number of children: None    Years of education: None    Highest education level: None   Tobacco Use    Smoking status: Never     Passive exposure: Never    Smokeless tobacco: Never   Substance and Sexual Activity    Alcohol use: Yes    Drug use: No     Social Determinants of Health     Financial Resource Strain: Low Risk  (8/23/2023)    Overall Financial Resource Strain (CARDIA)     Difficulty of Paying Living Expenses: Not hard at all   Transportation Needs: Unknown (8/23/2023)    PRAPARE - Transportation     Lack of Transportation (Non-Medical): No   Housing Stability: Unknown (8/23/2023)    Housing Stability Vital Sign     Unstable Housing in

## 2025-01-01 DIAGNOSIS — E78.2 MIXED HYPERLIPIDEMIA: ICD-10-CM

## 2025-01-01 DIAGNOSIS — E11.21 TYPE 2 DIABETES MELLITUS WITH DIABETIC NEPHROPATHY, WITHOUT LONG-TERM CURRENT USE OF INSULIN (HCC): ICD-10-CM

## 2025-02-13 ENCOUNTER — OFFICE VISIT (OUTPATIENT)
Age: 55
End: 2025-02-13
Payer: COMMERCIAL

## 2025-02-13 VITALS
HEART RATE: 91 BPM | BODY MASS INDEX: 30.96 KG/M2 | WEIGHT: 164 LBS | SYSTOLIC BLOOD PRESSURE: 129 MMHG | HEIGHT: 61 IN | DIASTOLIC BLOOD PRESSURE: 77 MMHG

## 2025-02-13 DIAGNOSIS — E78.2 MIXED HYPERLIPIDEMIA: ICD-10-CM

## 2025-02-13 DIAGNOSIS — E11.21 TYPE 2 DIABETES MELLITUS WITH DIABETIC NEPHROPATHY, WITHOUT LONG-TERM CURRENT USE OF INSULIN (HCC): Primary | ICD-10-CM

## 2025-02-13 LAB — HBA1C MFR BLD: 7.6 %

## 2025-02-13 PROCEDURE — 83036 HEMOGLOBIN GLYCOSYLATED A1C: CPT | Performed by: INTERNAL MEDICINE

## 2025-02-13 PROCEDURE — 3051F HG A1C>EQUAL 7.0%<8.0%: CPT | Performed by: INTERNAL MEDICINE

## 2025-02-13 PROCEDURE — 99214 OFFICE O/P EST MOD 30 MIN: CPT | Performed by: INTERNAL MEDICINE

## 2025-02-13 RX ORDER — BLOOD SUGAR DIAGNOSTIC
STRIP MISCELLANEOUS
Qty: 200 STRIP | Refills: 3 | Status: SHIPPED | OUTPATIENT
Start: 2025-02-13

## 2025-02-13 NOTE — PROGRESS NOTES
Chief Complaint   Patient presents with    Diabetes     PCP and Pharmacy verified     History of Present Illness: Roya Melendez is a 55 y.o. female here for follow up of diabetes.  She was diagnosed with diabetes in 2019. Pt notes that in 2013 she had GDM.     At our initial visit in May 2022 she was taking Metformin XR 2000 units HS, but she had been non-compliant with multiple DM agentes.  Her A1C was 12.2%.  We discussed at length the importance of dietary changes and low carb dieting.  I also started her on Lantus 10 units daily and Ozempic 0.5mg weekly.  Pt did not see me in follow up from May 2022 till October 2023.    At our last visit in September 2024 her A1C was 8.3%. I instructed her to continue the Basaglar 20 units daily, Farxiga 10mg daily, Metformin XR 2000mg daily and Ozempic 1.0mg weekly.    Pt denies any recent illnesses, injuries or hospitalizations.    She reports that she is taking Metformin XR 2000 units HS and Ozempic 1.0mg every Friday, Farxiga 10mg daily and Basalar 20 units daily.  She insists she has been taking her medications regularly.  \"I am not good about my eating habits\"    She is not testing her BG.    Her A1C today was 7.6%.    Her weight today was up from 156 pounds in October 2024 to 164 pounds.      - Pt wakes around 5AM  - She has breakfast around 6-9AM, yesterday she had biscotti, a camila and coffee (SF flavored syrup)  - She has lunch around 1130AM-2PM, yesterday she had sushi and water.    - She will have an afternoon snack of a cookie or candy.  - She has dinner around 8PM, last night she had pasta carbonara, chicken and water.   - She will have an evening snack \"it tends to be M&Ms, or pork rinds\".    She is not exercising, or staying physically active.     No history of vascular disease. Her father had DM, HTN and CVA.     She has urine proteinuria.      Last eye exam was January 2024, \"they saw a spot on the back of my eye that he wanted to monitor He said he did not

## 2025-02-17 DIAGNOSIS — E78.00 PURE HYPERCHOLESTEROLEMIA: ICD-10-CM

## 2025-02-17 RX ORDER — METFORMIN HYDROCHLORIDE 500 MG/1
TABLET, EXTENDED RELEASE ORAL
Qty: 360 TABLET | Refills: 1 | Status: SHIPPED | OUTPATIENT
Start: 2025-02-17

## 2025-02-17 RX ORDER — DAPAGLIFLOZIN 10 MG/1
10 TABLET, FILM COATED ORAL EVERY MORNING
Qty: 90 TABLET | Refills: 1 | Status: SHIPPED | OUTPATIENT
Start: 2025-02-17

## 2025-02-17 RX ORDER — ROSUVASTATIN CALCIUM 40 MG/1
40 TABLET, COATED ORAL NIGHTLY
Qty: 90 TABLET | Refills: 0 | Status: SHIPPED | OUTPATIENT
Start: 2025-02-17

## 2025-02-17 RX ORDER — INSULIN GLARGINE 100 [IU]/ML
20 INJECTION, SOLUTION SUBCUTANEOUS NIGHTLY
Qty: 30 ML | Refills: 1 | Status: SHIPPED | OUTPATIENT
Start: 2025-02-17

## 2025-02-17 RX ORDER — SEMAGLUTIDE 1.34 MG/ML
1 INJECTION, SOLUTION SUBCUTANEOUS
Qty: 9 ML | Refills: 1 | Status: SHIPPED | OUTPATIENT
Start: 2025-02-17

## 2025-02-17 RX ORDER — PEN NEEDLE, DIABETIC 32GX 5/32"
NEEDLE, DISPOSABLE MISCELLANEOUS
Qty: 100 EACH | Refills: 1 | Status: SHIPPED | OUTPATIENT
Start: 2025-02-17

## 2025-03-21 DIAGNOSIS — E78.00 PURE HYPERCHOLESTEROLEMIA: ICD-10-CM

## 2025-03-21 RX ORDER — ROSUVASTATIN CALCIUM 40 MG/1
40 TABLET, COATED ORAL NIGHTLY
Qty: 90 TABLET | Refills: 1 | Status: SHIPPED | OUTPATIENT
Start: 2025-03-21

## 2025-03-21 NOTE — TELEPHONE ENCOUNTER
Patient requested refill. Per last OV:  2) HLD > Her lipid panel was at goal in May 2024. Pt to continue the Rosuvastatin 40mg daily.

## 2025-06-02 RX ORDER — METFORMIN HYDROCHLORIDE 500 MG/1
TABLET, EXTENDED RELEASE ORAL
Qty: 360 TABLET | Refills: 1 | Status: SHIPPED | OUTPATIENT
Start: 2025-06-02

## 2025-07-29 RX ORDER — DAPAGLIFLOZIN 10 MG/1
10 TABLET, FILM COATED ORAL EVERY MORNING
Qty: 90 TABLET | Refills: 1 | Status: ACTIVE | OUTPATIENT
Start: 2025-07-29

## 2025-07-29 RX ORDER — SEMAGLUTIDE 1.34 MG/ML
INJECTION, SOLUTION SUBCUTANEOUS
Qty: 9 ML | Refills: 1 | Status: ACTIVE | OUTPATIENT
Start: 2025-07-29

## 2025-07-29 RX ORDER — INSULIN GLARGINE 100 [IU]/ML
20 INJECTION, SOLUTION SUBCUTANEOUS NIGHTLY
Qty: 30 ML | Refills: 1 | Status: SHIPPED | OUTPATIENT
Start: 2025-07-29